# Patient Record
Sex: FEMALE | Race: WHITE | NOT HISPANIC OR LATINO | Employment: OTHER | URBAN - METROPOLITAN AREA
[De-identification: names, ages, dates, MRNs, and addresses within clinical notes are randomized per-mention and may not be internally consistent; named-entity substitution may affect disease eponyms.]

---

## 2019-09-19 ENCOUNTER — OFFICE VISIT (OUTPATIENT)
Dept: FAMILY MEDICINE CLINIC | Facility: CLINIC | Age: 54
End: 2019-09-19

## 2019-09-19 VITALS
WEIGHT: 143.4 LBS | OXYGEN SATURATION: 94 % | DIASTOLIC BLOOD PRESSURE: 62 MMHG | TEMPERATURE: 97.6 F | HEIGHT: 60 IN | HEART RATE: 90 BPM | BODY MASS INDEX: 28.16 KG/M2 | SYSTOLIC BLOOD PRESSURE: 142 MMHG

## 2019-09-19 DIAGNOSIS — K21.9 GERD WITHOUT ESOPHAGITIS: ICD-10-CM

## 2019-09-19 DIAGNOSIS — E11.9 TYPE 2 DIABETES MELLITUS WITHOUT COMPLICATION, WITHOUT LONG-TERM CURRENT USE OF INSULIN (HCC): Primary | ICD-10-CM

## 2019-09-19 DIAGNOSIS — E78.2 MIXED HYPERLIPIDEMIA: ICD-10-CM

## 2019-09-19 DIAGNOSIS — Z79.899 ENCOUNTER FOR LONG-TERM (CURRENT) USE OF MEDICATIONS: ICD-10-CM

## 2019-09-19 PROBLEM — E78.5 HYPERLIPIDEMIA: Status: ACTIVE | Noted: 2019-09-19

## 2019-09-19 LAB — HBA1C MFR BLD: 7.4 %

## 2019-09-19 PROCEDURE — 99214 OFFICE O/P EST MOD 30 MIN: CPT | Performed by: FAMILY MEDICINE

## 2019-09-19 PROCEDURE — 83036 HEMOGLOBIN GLYCOSYLATED A1C: CPT | Performed by: FAMILY MEDICINE

## 2019-09-19 RX ORDER — CYCLOBENZAPRINE HCL 10 MG
TABLET ORAL
COMMUNITY
Start: 2019-09-18

## 2019-09-19 RX ORDER — ATORVASTATIN CALCIUM 40 MG/1
TABLET, FILM COATED ORAL
COMMUNITY
Start: 2019-06-19 | End: 2019-09-19 | Stop reason: SDUPTHER

## 2019-09-19 RX ORDER — METFORMIN HYDROCHLORIDE 500 MG/1
1000 TABLET, EXTENDED RELEASE ORAL 2 TIMES DAILY
Qty: 360 TABLET | Refills: 2 | Status: SHIPPED | OUTPATIENT
Start: 2019-09-19 | End: 2020-06-03

## 2019-09-19 RX ORDER — CLOBETASOL PROPIONATE 0.5 MG/G
CREAM TOPICAL
Refills: 5 | COMMUNITY
Start: 2019-07-30

## 2019-09-19 RX ORDER — GABAPENTIN 800 MG/1
TABLET ORAL
COMMUNITY
Start: 2019-08-16 | End: 2021-03-25 | Stop reason: DRUGHIGH

## 2019-09-19 RX ORDER — ATORVASTATIN CALCIUM 40 MG/1
40 TABLET, FILM COATED ORAL NIGHTLY
Qty: 90 TABLET | Refills: 1 | Status: SHIPPED | OUTPATIENT
Start: 2019-09-19 | End: 2020-06-03 | Stop reason: SDUPTHER

## 2019-09-19 RX ORDER — METFORMIN HYDROCHLORIDE 500 MG/1
TABLET, EXTENDED RELEASE ORAL
COMMUNITY
Start: 2019-06-19 | End: 2019-09-19 | Stop reason: SDUPTHER

## 2019-09-19 RX ORDER — HYDROCODONE BITARTRATE AND ACETAMINOPHEN 10; 325 MG/1; MG/1
TABLET ORAL
COMMUNITY
Start: 2019-08-24 | End: 2022-10-20

## 2019-09-19 NOTE — PROGRESS NOTES
Subjective   Fang Kunz is a 53 y.o. female.     Chief Complaint   Patient presents with   • Diabetes   • Hyperlipidemia       Patient is here today to follow-up on her chronic medical conditions:    Hyperlipidemia.  The patient is compliant with taking atorvastatin 40 mg daily and denies any side effects.  We did get the patient's cholesterol 3 months ago but it was elevated because the patient had not been taking her atorvastatin consistently at that time.  However for the past 3 months she has been more consistently taking her cholesterol medicine.    Diabetes.  The patient is compliant with taking metformin twice daily as directed.  She denies any side effects.  Patient states that her home blood sugar readings have been running a little higher than normal.    Reflux.  The patient takes Tums as needed symptoms.  She states that she gets symptoms about once maybe twice a month at the most.    Patient is also here for a new problem.  She stated that this morning she noticed a tender area on her left lower rib cage.  She denies any recent trauma or injury.  She states that it does not hurt unless she pushes on the area.  She denies any nausea or vomiting, bowel changes or fever.  She has not noticed any redness or bruising in the area.         Review of Systems   Constitutional: Negative for activity change, chills, fatigue and fever.   HENT: Negative for hearing loss, swollen glands, tinnitus and trouble swallowing.    Eyes: Negative for pain and visual disturbance.   Respiratory: Negative for cough and shortness of breath.    Cardiovascular: Negative for chest pain, palpitations and leg swelling.   Gastrointestinal: Negative for diarrhea and nausea.   Endocrine: Negative for polydipsia and polyuria.   Genitourinary: Negative for difficulty urinating and urinary incontinence.   Musculoskeletal: Negative for arthralgias, gait problem and joint swelling.   Skin: Negative for rash.   Allergic/Immunologic:  Negative for immunocompromised state.   Neurological: Negative for dizziness, light-headedness and headache.   Hematological: Negative for adenopathy. Does not bruise/bleed easily.   Psychiatric/Behavioral: Negative for dysphoric mood and sleep disturbance.       The following portions of the patient's history were reviewed and updated as appropriate: allergies, current medications, past family history, past medical history, past social history, past surgical history and problem list.    Past Medical History:   Diagnosis Date   • Abnormal thyroid screen (blood)    • Anxiety disorder    • Attention deficit disorder without hyperactivity    • Disc degeneration, lumbar    • Encounter for long-term (current) use of medications    • GERD without esophagitis    • Hyperlipidemia    • Joint pain, hip    • Joint pain, knee    • Measles without complication    • Migraine headache    • Mumps    • Type 2 diabetes mellitus without complication, without long-term current use of insulin (CMS/[facility])    • Type 2 diabetes mellitus, uncontrolled (CMS/[facility])    • Varicella        Past Surgical History:   Procedure Laterality Date   • TOTAL ABDOMINAL HYSTERECTOMY WITH SALPINGO OOPHORECTOMY  2004    for benign disease       Family History   Problem Relation Age of Onset   • Heart disease Other    • Diabetes Other        Social History     Socioeconomic History   • Marital status:      Spouse name: Not on file   • Number of children: Not on file   • Years of education: Not on file   • Highest education level: Not on file   Tobacco Use   • Smoking status: Current Every Day Smoker   • Tobacco comment: 1/2 PPD x 15+ years   Substance and Sexual Activity   • Alcohol use: Yes     Comment: rare         Current Outpatient Medications:   •  atorvastatin (LIPITOR) 40 MG tablet, Take 1 tablet by mouth Every Night., Disp: 90 tablet, Rfl: 1  •  cyclobenzaprine (FLEXERIL) 10 MG tablet, , Disp: , Rfl:   •  gabapentin (NEURONTIN) 800 MG tablet, ,  "Disp: , Rfl:   •  HYDROcodone-acetaminophen (NORCO)  MG per tablet, , Disp: , Rfl:   •  metFORMIN ER (GLUCOPHAGE-XR) 500 MG 24 hr tablet, Take 2 tablets by mouth 2 (Two) Times a Day., Disp: 360 tablet, Rfl: 2  •  clobetasol (TEMOVATE) 0.05 % cream, , Disp: , Rfl: 5    Objective     Vitals:    09/19/19 1500   BP: 142/62   Pulse: 90   Temp: 97.6 °F (36.4 °C)   SpO2: 94%   Weight: 65 kg (143 lb 6.4 oz)   Height: 152.4 cm (60\")       Body mass index is 28.01 kg/m².    No components found for: 2D    Physical Exam   Constitutional: She is oriented to person, place, and time. She appears well-developed and well-nourished.   HENT:   Head: Normocephalic and atraumatic.   Eyes: Conjunctivae are normal. No scleral icterus.   Neck: Normal range of motion. Neck supple. No thyromegaly present.   Cardiovascular: Normal rate, regular rhythm, normal heart sounds and intact distal pulses.   Pulmonary/Chest: Effort normal and breath sounds normal.   Abdominal: Soft. Bowel sounds are normal. She exhibits no distension and no mass. There is no tenderness. There is no rebound and no guarding.   Musculoskeletal: Normal range of motion. She exhibits no edema.   Tenderness to palpation over the left anterior lower rib cage.  No ecchymosis or erythema noted   Neurological: She is alert and oriented to person, place, and time.   Skin: Skin is warm and dry. Capillary refill takes less than 2 seconds. No rash noted.   Psychiatric: She has a normal mood and affect. Her behavior is normal. Judgment and thought content normal.   Nursing note and vitals reviewed.      Procedures    Assessment/Plan   Fang was seen today for diabetes and hyperlipidemia.    Diagnoses and all orders for this visit:    Type 2 diabetes mellitus without complication, without long-term current use of insulin (CMS/[facility])  -     POC Glycosylated Hemoglobin (Hb A1C)  -     Comprehensive Metabolic Panel  -     metFORMIN ER (GLUCOPHAGE-XR) 500 MG 24 hr tablet; Take 2 " tablets by mouth 2 (Two) Times a Day.    Mixed hyperlipidemia  -     Lipid Panel  -     atorvastatin (LIPITOR) 40 MG tablet; Take 1 tablet by mouth Every Night.    GERD without esophagitis  -     CBC & Differential    Encounter for long-term (current) use of medications  -     Lipid Panel  -     TSH  -     Comprehensive Metabolic Panel  -     CBC & Differential        Patient Instructions   Reassurance given that the area in the left lower rib cage was likely related to a musculoskeletal problem and should improve slowly given time.  I advised the patient to use ice no heat.  I increased metformin from twice daily to 4 times daily for better blood sugar control.  Any medication changes will be made based on labs drawn today and will be called to the patient later this week.           EMR Dragon/transcription disclaimer:  Much of this encounter note is an electronic transcription of spoken language to printed text.  The electronic translation of spoken language may permit errors, omissions or at times nonsensical words or phrases to be inadvertently transcribed.  Although I have reviewed the note for such errors, some may still exist.  Please contact the office for any clarification.

## 2019-09-19 NOTE — PATIENT INSTRUCTIONS
Reassurance given that the area in the left lower rib cage was likely related to a musculoskeletal problem and should improve slowly given time.  I advised the patient to use ice no heat.  I increased metformin from twice daily to 4 times daily for better blood sugar control.  Any medication changes will be made based on labs drawn today and will be called to the patient later this week.

## 2019-09-20 LAB
ALBUMIN SERPL-MCNC: 4.5 G/DL (ref 3.5–5.5)
ALBUMIN/GLOB SERPL: 1.7 {RATIO} (ref 1.2–2.2)
ALP SERPL-CCNC: 72 IU/L (ref 39–117)
ALT SERPL-CCNC: 29 IU/L (ref 0–32)
AST SERPL-CCNC: 20 IU/L (ref 0–40)
BASOPHILS # BLD AUTO: 0 X10E3/UL (ref 0–0.2)
BASOPHILS NFR BLD AUTO: 0 %
BILIRUB SERPL-MCNC: 0.3 MG/DL (ref 0–1.2)
BUN SERPL-MCNC: 8 MG/DL (ref 6–24)
BUN/CREAT SERPL: 14 (ref 9–23)
CALCIUM SERPL-MCNC: 9.8 MG/DL (ref 8.7–10.2)
CHLORIDE SERPL-SCNC: 101 MMOL/L (ref 96–106)
CHOLEST SERPL-MCNC: 97 MG/DL (ref 100–199)
CO2 SERPL-SCNC: 20 MMOL/L (ref 20–29)
CREAT SERPL-MCNC: 0.58 MG/DL (ref 0.57–1)
EOSINOPHIL # BLD AUTO: 0.1 X10E3/UL (ref 0–0.4)
EOSINOPHIL NFR BLD AUTO: 1 %
ERYTHROCYTE [DISTWIDTH] IN BLOOD BY AUTOMATED COUNT: 13.1 % (ref 12.3–15.4)
GLOBULIN SER CALC-MCNC: 2.6 G/DL (ref 1.5–4.5)
GLUCOSE SERPL-MCNC: 125 MG/DL (ref 65–99)
HCT VFR BLD AUTO: 40.8 % (ref 34–46.6)
HDLC SERPL-MCNC: 38 MG/DL
HGB BLD-MCNC: 14.3 G/DL (ref 11.1–15.9)
IMM GRANULOCYTES # BLD AUTO: 0 X10E3/UL (ref 0–0.1)
IMM GRANULOCYTES NFR BLD AUTO: 0 %
LDLC SERPL CALC-MCNC: 33 MG/DL (ref 0–99)
LYMPHOCYTES # BLD AUTO: 4.5 X10E3/UL (ref 0.7–3.1)
LYMPHOCYTES NFR BLD AUTO: 53 %
MCH RBC QN AUTO: 33 PG (ref 26.6–33)
MCHC RBC AUTO-ENTMCNC: 35 G/DL (ref 31.5–35.7)
MCV RBC AUTO: 94 FL (ref 79–97)
MONOCYTES # BLD AUTO: 0.3 X10E3/UL (ref 0.1–0.9)
MONOCYTES NFR BLD AUTO: 3 %
NEUTROPHILS # BLD AUTO: 3.6 X10E3/UL (ref 1.4–7)
NEUTROPHILS NFR BLD AUTO: 43 %
PLATELET # BLD AUTO: 200 X10E3/UL (ref 150–450)
POTASSIUM SERPL-SCNC: 4.3 MMOL/L (ref 3.5–5.2)
PROT SERPL-MCNC: 7.1 G/DL (ref 6–8.5)
RBC # BLD AUTO: 4.33 X10E6/UL (ref 3.77–5.28)
SODIUM SERPL-SCNC: 140 MMOL/L (ref 134–144)
TRIGL SERPL-MCNC: 129 MG/DL (ref 0–149)
TSH SERPL DL<=0.005 MIU/L-ACNC: 0.77 UIU/ML (ref 0.45–4.5)
VLDLC SERPL CALC-MCNC: 26 MG/DL (ref 5–40)
WBC # BLD AUTO: 8.4 X10E3/UL (ref 3.4–10.8)

## 2020-02-13 ENCOUNTER — OFFICE VISIT (OUTPATIENT)
Dept: FAMILY MEDICINE CLINIC | Facility: CLINIC | Age: 55
End: 2020-02-13

## 2020-02-13 VITALS
HEART RATE: 85 BPM | SYSTOLIC BLOOD PRESSURE: 112 MMHG | TEMPERATURE: 98.1 F | BODY MASS INDEX: 28.94 KG/M2 | DIASTOLIC BLOOD PRESSURE: 70 MMHG | WEIGHT: 147.4 LBS | OXYGEN SATURATION: 98 % | HEIGHT: 60 IN

## 2020-02-13 DIAGNOSIS — J06.9 ACUTE URI: Primary | ICD-10-CM

## 2020-02-13 PROCEDURE — 99213 OFFICE O/P EST LOW 20 MIN: CPT | Performed by: FAMILY MEDICINE

## 2020-02-13 RX ORDER — ALBUTEROL SULFATE 90 UG/1
2 AEROSOL, METERED RESPIRATORY (INHALATION) EVERY 4 HOURS PRN
COMMUNITY

## 2020-02-13 RX ORDER — DEXTROMETHORPHAN HYDROBROMIDE AND PROMETHAZINE HYDROCHLORIDE 15; 6.25 MG/5ML; MG/5ML
5 SYRUP ORAL 4 TIMES DAILY PRN
Qty: 180 ML | Refills: 0 | Status: SHIPPED | OUTPATIENT
Start: 2020-02-13 | End: 2020-02-19 | Stop reason: SDUPTHER

## 2020-02-13 RX ORDER — BENZONATATE 200 MG/1
200 CAPSULE ORAL 3 TIMES DAILY PRN
Qty: 30 CAPSULE | Refills: 0 | Status: SHIPPED | OUTPATIENT
Start: 2020-02-13 | End: 2020-08-13

## 2020-02-13 NOTE — PROGRESS NOTES
Subjective   Fang Kunz is a 54 y.o. female.     Chief Complaint   Patient presents with   • Nasal Congestion   • Ear Fullness     not sore   • Cough       Patient is here with a new problem.  She started about 2 to 3 days ago with nasal congestion, cough, scratchy throat, and sinus congestion. +fatigue and body aches.  Patient did receive the flu vaccine this year.  Patient does smoke.  No fever or chills       Review of Systems   Constitutional: Positive for fatigue. Negative for activity change, chills and fever.   HENT: Positive for congestion, postnasal drip, rhinorrhea and sinus pressure. Negative for hearing loss, sore throat, swollen glands, tinnitus and trouble swallowing.    Eyes: Negative for pain and visual disturbance.   Respiratory: Positive for cough. Negative for shortness of breath.    Cardiovascular: Negative for chest pain, palpitations and leg swelling.   Gastrointestinal: Negative for diarrhea and nausea.   Endocrine: Negative for polydipsia and polyuria.   Genitourinary: Negative for difficulty urinating and urinary incontinence.   Musculoskeletal: Positive for myalgias. Negative for arthralgias, gait problem and joint swelling.   Skin: Negative for rash.   Allergic/Immunologic: Negative for immunocompromised state.   Neurological: Negative for dizziness, light-headedness and headache.   Hematological: Negative for adenopathy. Does not bruise/bleed easily.   Psychiatric/Behavioral: Negative for dysphoric mood and sleep disturbance.       The following portions of the patient's history were reviewed and updated as appropriate: allergies, current medications, past family history, past medical history, past social history, past surgical history and problem list.    Past Medical History:   Diagnosis Date   • Abnormal thyroid screen (blood)    • Anxiety disorder    • Attention deficit disorder without hyperactivity    • Disc degeneration, lumbar    • Encounter for long-term (current) use of  medications    • GERD without esophagitis    • Hyperlipidemia    • Joint pain, hip    • Joint pain, knee    • Measles without complication    • Migraine headache    • Mumps    • Type 2 diabetes mellitus without complication, without long-term current use of insulin (CMS/[facility])    • Type 2 diabetes mellitus, uncontrolled (CMS/[facility])    • Varicella        Past Surgical History:   Procedure Laterality Date   • TOTAL ABDOMINAL HYSTERECTOMY WITH SALPINGO OOPHORECTOMY  2004    for benign disease       Family History   Problem Relation Age of Onset   • Heart disease Other    • Diabetes Other        Social History     Socioeconomic History   • Marital status:      Spouse name: Not on file   • Number of children: Not on file   • Years of education: Not on file   • Highest education level: Not on file   Tobacco Use   • Smoking status: Current Every Day Smoker   • Tobacco comment: 1/2 PPD x 15+ years   Substance and Sexual Activity   • Alcohol use: Yes     Comment: rare   • Drug use: Never         Current Outpatient Medications:   •  albuterol sulfate  (90 Base) MCG/ACT inhaler, Inhale 2 puffs Every 4 (Four) Hours As Needed., Disp: , Rfl:   •  atorvastatin (LIPITOR) 40 MG tablet, Take 1 tablet by mouth Every Night., Disp: 90 tablet, Rfl: 1  •  cyclobenzaprine (FLEXERIL) 10 MG tablet, , Disp: , Rfl:   •  gabapentin (NEURONTIN) 800 MG tablet, , Disp: , Rfl:   •  HYDROcodone-acetaminophen (NORCO)  MG per tablet, , Disp: , Rfl:   •  metFORMIN ER (GLUCOPHAGE-XR) 500 MG 24 hr tablet, Take 2 tablets by mouth 2 (Two) Times a Day., Disp: 360 tablet, Rfl: 2  •  benzonatate (TESSALON) 200 MG capsule, Take 1 capsule by mouth 3 (Three) Times a Day As Needed for Cough., Disp: 30 capsule, Rfl: 0  •  clobetasol (TEMOVATE) 0.05 % cream, , Disp: , Rfl: 5  •  promethazine-dextromethorphan (PROMETHAZINE-DM) 6.25-15 MG/5ML syrup, Take 5 mL by mouth 4 (Four) Times a Day As Needed for Cough., Disp: 180 mL, Rfl: 0  •  Unable to find,  ", Disp: , Rfl:     Objective     Vitals:    02/13/20 1306   BP: 112/70   Pulse: 85   Temp: 98.1 °F (36.7 °C)   SpO2: 98%   Weight: 66.9 kg (147 lb 6.4 oz)   Height: 152.4 cm (60\")       Body mass index is 28.79 kg/m².    No components found for: 2D    Physical Exam   Constitutional: She is oriented to person, place, and time. She appears well-developed and well-nourished.   HENT:   Head: Normocephalic and atraumatic.   Right Ear: External ear normal.   Left Ear: External ear normal.   Nose: Nose normal.   Mouth/Throat: Uvula is midline and mucous membranes are normal. Posterior oropharyngeal erythema present.   Eyes: Conjunctivae are normal. No scleral icterus.   Neck: Normal range of motion. Neck supple.   Cardiovascular: Normal rate, regular rhythm, normal heart sounds and intact distal pulses.   Pulmonary/Chest: Effort normal and breath sounds normal.   Abdominal: Soft. Bowel sounds are normal.   Musculoskeletal: Normal range of motion. She exhibits no edema.   Lymphadenopathy:     She has no cervical adenopathy.   Neurological: She is alert and oriented to person, place, and time.   Skin: Skin is warm and dry. Capillary refill takes less than 2 seconds. No rash noted.   Psychiatric: She has a normal mood and affect. Her behavior is normal. Judgment and thought content normal.   Nursing note and vitals reviewed.      Procedures    Assessment/Plan   Fang was seen today for nasal congestion, ear fullness and cough.    Diagnoses and all orders for this visit:    Acute URI  -     promethazine-dextromethorphan (PROMETHAZINE-DM) 6.25-15 MG/5ML syrup; Take 5 mL by mouth 4 (Four) Times a Day As Needed for Cough.  -     benzonatate (TESSALON) 200 MG capsule; Take 1 capsule by mouth 3 (Three) Times a Day As Needed for Cough.        Patient Instructions   Patient was advised to call in 5 days if she is not improving and especially if she is worsening.  At that time I would consider calling in an antibiotic for a sinus " "infection.  Continue using albuterol inhaler as needed.  Stop smoking.      Upper Respiratory Infection, Adult  An upper respiratory infection (URI) affects the nose, throat, and upper air passages. URIs are caused by germs (viruses). The most common type of URI is often called \"the common cold.\"  Medicines cannot cure URIs, but you can do things at home to relieve your symptoms. URIs usually get better within 7-10 days.  Follow these instructions at home:  Activity  · Rest as needed.  · If you have a fever, stay home from work or school until your fever is gone, or until your doctor says you may return to work or school.  ? You should stay home until you cannot spread the infection anymore (you are not contagious).  ? Your doctor may have you wear a face mask so you have less risk of spreading the infection.  Relieving symptoms  · Gargle with a salt-water mixture 3-4 times a day or as needed. To make a salt-water mixture, completely dissolve ½-1 tsp of salt in 1 cup of warm water.  · Use a cool-mist humidifier to add moisture to the air. This can help you breathe more easily.  Eating and drinking    · Drink enough fluid to keep your pee (urine) pale yellow.  · Eat soups and other clear broths.  General instructions    · Take over-the-counter and prescription medicines only as told by your doctor. These include cold medicines, fever reducers, and cough suppressants.  · Do not use any products that contain nicotine or tobacco. These include cigarettes and e-cigarettes. If you need help quitting, ask your doctor.  · Avoid being where people are smoking (avoid secondhand smoke).  · Make sure you get regular shots and get the flu shot every year.  · Keep all follow-up visits as told by your doctor. This is important.  How to avoid spreading infection to others    · Wash your hands often with soap and water. If you do not have soap and water, use hand .  · Avoid touching your mouth, face, eyes, or nose.  · Cough " "or sneeze into a tissue or your sleeve or elbow. Do not cough or sneeze into your hand or into the air.  Contact a doctor if:  · You are getting worse, not better.  · You have any of these:  ? A fever.  ? Chills.  ? Brown or red mucus in your nose.  ? Yellow or brown fluid (discharge)coming from your nose.  ? Pain in your face, especially when you bend forward.  ? Swollen neck glands.  ? Pain with swallowing.  ? White areas in the back of your throat.  Get help right away if:  · You have shortness of breath that gets worse.  · You have very bad or constant:  ? Headache.  ? Ear pain.  ? Pain in your forehead, behind your eyes, and over your cheekbones (sinus pain).  ? Chest pain.  · You have long-lasting (chronic) lung disease along with any of these:  ? Wheezing.  ? Long-lasting cough.  ? Coughing up blood.  ? A change in your usual mucus.  · You have a stiff neck.  · You have changes in your:  ? Vision.  ? Hearing.  ? Thinking.  ? Mood.  Summary  · An upper respiratory infection (URI) is caused by a germ called a virus. The most common type of URI is often called \"the common cold.\"  · URIs usually get better within 7-10 days.  · Take over-the-counter and prescription medicines only as told by your doctor.  This information is not intended to replace advice given to you by your health care provider. Make sure you discuss any questions you have with your health care provider.  Document Released: 06/05/2009 Document Revised: 08/10/2018 Document Reviewed: 08/10/2018  [email or web address] Interactive Patient Education © 2019 [employer]             "

## 2020-02-13 NOTE — PATIENT INSTRUCTIONS
"Patient was advised to call in 5 days if she is not improving and especially if she is worsening.  At that time I would consider calling in an antibiotic for a sinus infection.  Continue using albuterol inhaler as needed.  Stop smoking.      Upper Respiratory Infection, Adult  An upper respiratory infection (URI) affects the nose, throat, and upper air passages. URIs are caused by germs (viruses). The most common type of URI is often called \"the common cold.\"  Medicines cannot cure URIs, but you can do things at home to relieve your symptoms. URIs usually get better within 7-10 days.  Follow these instructions at home:  Activity  · Rest as needed.  · If you have a fever, stay home from work or school until your fever is gone, or until your doctor says you may return to work or school.  ? You should stay home until you cannot spread the infection anymore (you are not contagious).  ? Your doctor may have you wear a face mask so you have less risk of spreading the infection.  Relieving symptoms  · Gargle with a salt-water mixture 3-4 times a day or as needed. To make a salt-water mixture, completely dissolve ½-1 tsp of salt in 1 cup of warm water.  · Use a cool-mist humidifier to add moisture to the air. This can help you breathe more easily.  Eating and drinking    · Drink enough fluid to keep your pee (urine) pale yellow.  · Eat soups and other clear broths.  General instructions    · Take over-the-counter and prescription medicines only as told by your doctor. These include cold medicines, fever reducers, and cough suppressants.  · Do not use any products that contain nicotine or tobacco. These include cigarettes and e-cigarettes. If you need help quitting, ask your doctor.  · Avoid being where people are smoking (avoid secondhand smoke).  · Make sure you get regular shots and get the flu shot every year.  · Keep all follow-up visits as told by your doctor. This is important.  How to avoid spreading infection to " "others    · Wash your hands often with soap and water. If you do not have soap and water, use hand .  · Avoid touching your mouth, face, eyes, or nose.  · Cough or sneeze into a tissue or your sleeve or elbow. Do not cough or sneeze into your hand or into the air.  Contact a doctor if:  · You are getting worse, not better.  · You have any of these:  ? A fever.  ? Chills.  ? Brown or red mucus in your nose.  ? Yellow or brown fluid (discharge)coming from your nose.  ? Pain in your face, especially when you bend forward.  ? Swollen neck glands.  ? Pain with swallowing.  ? White areas in the back of your throat.  Get help right away if:  · You have shortness of breath that gets worse.  · You have very bad or constant:  ? Headache.  ? Ear pain.  ? Pain in your forehead, behind your eyes, and over your cheekbones (sinus pain).  ? Chest pain.  · You have long-lasting (chronic) lung disease along with any of these:  ? Wheezing.  ? Long-lasting cough.  ? Coughing up blood.  ? A change in your usual mucus.  · You have a stiff neck.  · You have changes in your:  ? Vision.  ? Hearing.  ? Thinking.  ? Mood.  Summary  · An upper respiratory infection (URI) is caused by a germ called a virus. The most common type of URI is often called \"the common cold.\"  · URIs usually get better within 7-10 days.  · Take over-the-counter and prescription medicines only as told by your doctor.  This information is not intended to replace advice given to you by your health care provider. Make sure you discuss any questions you have with your health care provider.  Document Released: 06/05/2009 Document Revised: 08/10/2018 Document Reviewed: 08/10/2018  [employer] Interactive Patient Education © 2019 [employer]    "

## 2020-02-19 ENCOUNTER — TELEPHONE (OUTPATIENT)
Dept: FAMILY MEDICINE CLINIC | Facility: CLINIC | Age: 55
End: 2020-02-19

## 2020-02-19 DIAGNOSIS — J06.9 ACUTE URI: Primary | ICD-10-CM

## 2020-02-19 RX ORDER — DOXYCYCLINE HYCLATE 100 MG/1
100 TABLET, DELAYED RELEASE ORAL 2 TIMES DAILY
Qty: 20 TABLET | Refills: 0 | Status: SHIPPED | OUTPATIENT
Start: 2020-02-19 | End: 2020-08-13

## 2020-02-19 RX ORDER — DEXTROMETHORPHAN HYDROBROMIDE AND PROMETHAZINE HYDROCHLORIDE 15; 6.25 MG/5ML; MG/5ML
5 SYRUP ORAL 4 TIMES DAILY PRN
Qty: 180 ML | Refills: 0 | Status: SHIPPED | OUTPATIENT
Start: 2020-02-19 | End: 2020-08-13

## 2020-02-19 NOTE — TELEPHONE ENCOUNTER
I have sent in doxycycline 100 mg twice daily for the next 10 days and a refill of the cough syrup.

## 2020-02-19 NOTE — TELEPHONE ENCOUNTER
Patient was seen Thursday of last week. She was told to call by the early of end of this week if she wasn't better and an ABT would be called in. Patient is calling requesting an ABT to be sent in but to also get a refill on her cough syrup.

## 2020-06-03 DIAGNOSIS — E11.9 TYPE 2 DIABETES MELLITUS WITHOUT COMPLICATION, WITHOUT LONG-TERM CURRENT USE OF INSULIN (HCC): ICD-10-CM

## 2020-06-03 DIAGNOSIS — E78.2 MIXED HYPERLIPIDEMIA: ICD-10-CM

## 2020-06-03 RX ORDER — ATORVASTATIN CALCIUM 40 MG/1
40 TABLET, FILM COATED ORAL NIGHTLY
Qty: 90 TABLET | Refills: 0 | OUTPATIENT
Start: 2020-06-03

## 2020-06-03 RX ORDER — ATORVASTATIN CALCIUM 40 MG/1
40 TABLET, FILM COATED ORAL NIGHTLY
Qty: 90 TABLET | Refills: 0 | Status: SHIPPED | OUTPATIENT
Start: 2020-06-03 | End: 2020-11-16 | Stop reason: SDUPTHER

## 2020-06-03 RX ORDER — METFORMIN HYDROCHLORIDE 500 MG/1
1000 TABLET, EXTENDED RELEASE ORAL 2 TIMES DAILY
Qty: 360 TABLET | Refills: 0 | Status: SHIPPED | OUTPATIENT
Start: 2020-06-03 | End: 2020-08-13 | Stop reason: SDUPTHER

## 2020-06-03 RX ORDER — METFORMIN HYDROCHLORIDE 500 MG/1
1000 TABLET, EXTENDED RELEASE ORAL 2 TIMES DAILY
COMMUNITY
End: 2020-06-03 | Stop reason: SDUPTHER

## 2020-08-13 ENCOUNTER — OFFICE VISIT (OUTPATIENT)
Dept: FAMILY MEDICINE CLINIC | Facility: CLINIC | Age: 55
End: 2020-08-13

## 2020-08-13 VITALS
OXYGEN SATURATION: 99 % | WEIGHT: 141 LBS | HEART RATE: 92 BPM | HEIGHT: 60 IN | TEMPERATURE: 97.8 F | DIASTOLIC BLOOD PRESSURE: 64 MMHG | BODY MASS INDEX: 27.68 KG/M2 | SYSTOLIC BLOOD PRESSURE: 122 MMHG

## 2020-08-13 DIAGNOSIS — Z79.899 ENCOUNTER FOR LONG-TERM (CURRENT) USE OF MEDICATIONS: ICD-10-CM

## 2020-08-13 DIAGNOSIS — K21.9 GERD WITHOUT ESOPHAGITIS: ICD-10-CM

## 2020-08-13 DIAGNOSIS — E11.9 TYPE 2 DIABETES MELLITUS WITHOUT COMPLICATION, WITHOUT LONG-TERM CURRENT USE OF INSULIN (HCC): Primary | ICD-10-CM

## 2020-08-13 DIAGNOSIS — E78.2 MIXED HYPERLIPIDEMIA: ICD-10-CM

## 2020-08-13 LAB
HBA1C MFR BLD: 7.5 %
POC CREATININE URINE: 50
POC MICROALBUMIN URINE: 30

## 2020-08-13 PROCEDURE — 83036 HEMOGLOBIN GLYCOSYLATED A1C: CPT | Performed by: FAMILY MEDICINE

## 2020-08-13 PROCEDURE — 99214 OFFICE O/P EST MOD 30 MIN: CPT | Performed by: FAMILY MEDICINE

## 2020-08-13 PROCEDURE — 82044 UR ALBUMIN SEMIQUANTITATIVE: CPT | Performed by: FAMILY MEDICINE

## 2020-08-13 RX ORDER — METFORMIN HYDROCHLORIDE 500 MG/1
1000 TABLET, EXTENDED RELEASE ORAL 2 TIMES DAILY
Qty: 360 TABLET | Refills: 0 | Status: SHIPPED | OUTPATIENT
Start: 2020-08-13 | End: 2020-09-16

## 2020-08-13 NOTE — PROGRESS NOTES
Subjective   Fang Kunz is a 54 y.o. female.     Chief Complaint   Patient presents with   • Diabetes   • Hyperlipidemia       Patient is here today to follow-up on her chronic medical conditions:    Hyperlipidemia.  The patient is compliant with taking atorvastatin 40 mg daily and denies any side effects.  Cholesterol was at goal September 2020    Diabetes.  The patient is compliant with taking metformin 1000 mg twice daily as directed.  She denies any side effects.  Patient states that her home blood sugar readings have been running a little better lately    Reflux.  The patient takes Tums as needed symptoms.  She states that she gets symptoms about once maybe twice a week at the most.       Review of Systems   Constitutional: Negative for activity change, chills, fatigue and fever.   HENT: Negative for hearing loss, swollen glands, tinnitus and trouble swallowing.    Eyes: Negative for pain and visual disturbance.   Respiratory: Negative for cough and shortness of breath.    Cardiovascular: Negative for chest pain, palpitations and leg swelling.   Gastrointestinal: Negative for diarrhea and nausea.   Endocrine: Negative for polydipsia and polyuria.   Genitourinary: Negative for difficulty urinating and urinary incontinence.   Musculoskeletal: Negative for arthralgias, gait problem and joint swelling.   Skin: Negative for rash.   Allergic/Immunologic: Negative for immunocompromised state.   Neurological: Negative for dizziness, light-headedness and headache.   Hematological: Negative for adenopathy. Does not bruise/bleed easily.   Psychiatric/Behavioral: Negative for dysphoric mood and sleep disturbance.       The following portions of the patient's history were reviewed and updated as appropriate: allergies, current medications, past family history, past medical history, past social history, past surgical history and problem list.    Past Medical History:   Diagnosis Date   • Abnormal thyroid screen (blood)     • Anxiety disorder    • Attention deficit disorder without hyperactivity    • Disc degeneration, lumbar    • Encounter for long-term (current) use of medications    • GERD without esophagitis    • Hyperlipidemia    • Joint pain, hip    • Joint pain, knee    • Measles without complication    • Migraine headache    • Mumps    • Type 2 diabetes mellitus without complication, without long-term current use of insulin (CMS/[facility])    • Type 2 diabetes mellitus, uncontrolled (CMS/[facility])    • Varicella        Past Surgical History:   Procedure Laterality Date   • TOTAL ABDOMINAL HYSTERECTOMY WITH SALPINGO OOPHORECTOMY  2004    for benign disease       Family History   Problem Relation Age of Onset   • Heart disease Other    • Diabetes Other        Social History     Socioeconomic History   • Marital status:      Spouse name: Not on file   • Number of children: Not on file   • Years of education: Not on file   • Highest education level: Not on file   Tobacco Use   • Smoking status: Current Every Day Smoker   • Smokeless tobacco: Never Used   • Tobacco comment: 1/2 PPD x 15+ years   Substance and Sexual Activity   • Alcohol use: Yes     Comment: rare   • Drug use: Never         Current Outpatient Medications:   •  albuterol sulfate  (90 Base) MCG/ACT inhaler, Inhale 2 puffs Every 4 (Four) Hours As Needed., Disp: , Rfl:   •  atorvastatin (LIPITOR) 40 MG tablet, Take 1 tablet by mouth Every Night., Disp: 90 tablet, Rfl: 0  •  cyclobenzaprine (FLEXERIL) 10 MG tablet, , Disp: , Rfl:   •  gabapentin (NEURONTIN) 800 MG tablet, , Disp: , Rfl:   •  HYDROcodone-acetaminophen (NORCO)  MG per tablet, , Disp: , Rfl:   •  metFORMIN ER (GLUCOPHAGE-XR) 500 MG 24 hr tablet, Take 2 tablets by mouth 2 (Two) Times a Day., Disp: 360 tablet, Rfl: 0  •  clobetasol (TEMOVATE) 0.05 % cream, , Disp: , Rfl: 5  •  Unable to find, , Disp: , Rfl:     Objective     Vitals:    08/13/20 1237   BP: 122/64   Pulse: 92   Temp: 97.8 °F (36.6  "°C)   SpO2: 99%   Weight: 64 kg (141 lb)   Height: 152.4 cm (60\")       Body mass index is 27.54 kg/m².    No components found for: 2D    Physical Exam   Constitutional: She is oriented to person, place, and time. She appears well-developed and well-nourished.   HENT:   Head: Normocephalic and atraumatic.   Eyes: Conjunctivae are normal.   Neck: Normal range of motion. Neck supple.   Cardiovascular: Normal rate, regular rhythm, normal heart sounds and intact distal pulses.   Pulmonary/Chest: Effort normal and breath sounds normal.   Abdominal: Soft. Bowel sounds are normal.   Musculoskeletal: Normal range of motion. She exhibits no edema.   Neurological: She is alert and oriented to person, place, and time.   Skin: Skin is warm and dry. Capillary refill takes less than 2 seconds. No rash noted.   Psychiatric: She has a normal mood and affect. Her behavior is normal. Judgment and thought content normal.   Nursing note and vitals reviewed.      Procedures    Assessment/Plan   Fang was seen today for diabetes and hyperlipidemia.    Diagnoses and all orders for this visit:    Type 2 diabetes mellitus without complication, without long-term current use of insulin (CMS/[facility])  -     POC Glycosylated Hemoglobin (Hb A1C)  -     POC Microalbumin  -     Comprehensive Metabolic Panel  -     metFORMIN ER (GLUCOPHAGE-XR) 500 MG 24 hr tablet; Take 2 tablets by mouth 2 (Two) Times a Day.    Mixed hyperlipidemia  -     Lipid Panel    GERD without esophagitis    Encounter for long-term (current) use of medications  -     CBC & Differential  -     Comprehensive Metabolic Panel  -     TSH  -     Lipid Panel    Surveillance labs were obtained today and any medication changes will be made based on lab results and will be called to the patient later this week.    There are no Patient Instructions on file for this visit.         "

## 2020-08-14 LAB
ALBUMIN SERPL-MCNC: 4.5 G/DL (ref 3.8–4.9)
ALBUMIN/GLOB SERPL: 1.7 {RATIO} (ref 1.2–2.2)
ALP SERPL-CCNC: 70 IU/L (ref 39–117)
ALT SERPL-CCNC: 30 IU/L (ref 0–32)
AST SERPL-CCNC: 23 IU/L (ref 0–40)
BASOPHILS # BLD AUTO: 0 X10E3/UL (ref 0–0.2)
BASOPHILS NFR BLD AUTO: 0 %
BILIRUB SERPL-MCNC: <0.2 MG/DL (ref 0–1.2)
BUN SERPL-MCNC: 7 MG/DL (ref 6–24)
BUN/CREAT SERPL: 12 (ref 9–23)
CALCIUM SERPL-MCNC: 9.5 MG/DL (ref 8.7–10.2)
CHLORIDE SERPL-SCNC: 104 MMOL/L (ref 96–106)
CHOLEST SERPL-MCNC: 83 MG/DL (ref 100–199)
CO2 SERPL-SCNC: 22 MMOL/L (ref 20–29)
CREAT SERPL-MCNC: 0.57 MG/DL (ref 0.57–1)
EOSINOPHIL # BLD AUTO: 0.1 X10E3/UL (ref 0–0.4)
EOSINOPHIL NFR BLD AUTO: 1 %
ERYTHROCYTE [DISTWIDTH] IN BLOOD BY AUTOMATED COUNT: 12.5 % (ref 11.7–15.4)
GLOBULIN SER CALC-MCNC: 2.6 G/DL (ref 1.5–4.5)
GLUCOSE SERPL-MCNC: 97 MG/DL (ref 65–99)
HCT VFR BLD AUTO: 43.7 % (ref 34–46.6)
HDLC SERPL-MCNC: 33 MG/DL
HGB BLD-MCNC: 14.8 G/DL (ref 11.1–15.9)
IMM GRANULOCYTES # BLD AUTO: 0 X10E3/UL (ref 0–0.1)
IMM GRANULOCYTES NFR BLD AUTO: 0 %
LDLC SERPL CALC-MCNC: 24 MG/DL (ref 0–99)
LYMPHOCYTES # BLD AUTO: 5.1 X10E3/UL (ref 0.7–3.1)
LYMPHOCYTES NFR BLD AUTO: 54 %
MCH RBC QN AUTO: 32.7 PG (ref 26.6–33)
MCHC RBC AUTO-ENTMCNC: 33.9 G/DL (ref 31.5–35.7)
MCV RBC AUTO: 97 FL (ref 79–97)
MONOCYTES # BLD AUTO: 0.4 X10E3/UL (ref 0.1–0.9)
MONOCYTES NFR BLD AUTO: 5 %
NEUTROPHILS # BLD AUTO: 3.8 X10E3/UL (ref 1.4–7)
NEUTROPHILS NFR BLD AUTO: 40 %
PLATELET # BLD AUTO: 222 X10E3/UL (ref 150–450)
POTASSIUM SERPL-SCNC: 4.2 MMOL/L (ref 3.5–5.2)
PROT SERPL-MCNC: 7.1 G/DL (ref 6–8.5)
RBC # BLD AUTO: 4.52 X10E6/UL (ref 3.77–5.28)
SODIUM SERPL-SCNC: 139 MMOL/L (ref 134–144)
TRIGL SERPL-MCNC: 131 MG/DL (ref 0–149)
TSH SERPL DL<=0.005 MIU/L-ACNC: 0.6 UIU/ML (ref 0.45–4.5)
VLDLC SERPL CALC-MCNC: 26 MG/DL (ref 5–40)
WBC # BLD AUTO: 9.5 X10E3/UL (ref 3.4–10.8)

## 2020-09-16 DIAGNOSIS — E11.9 TYPE 2 DIABETES MELLITUS WITHOUT COMPLICATION, WITHOUT LONG-TERM CURRENT USE OF INSULIN (HCC): ICD-10-CM

## 2020-09-16 RX ORDER — METFORMIN HYDROCHLORIDE 500 MG/1
TABLET, EXTENDED RELEASE ORAL
Qty: 360 TABLET | Refills: 0 | Status: SHIPPED | OUTPATIENT
Start: 2020-09-16 | End: 2020-11-16 | Stop reason: SDUPTHER

## 2020-11-16 ENCOUNTER — OFFICE VISIT (OUTPATIENT)
Dept: FAMILY MEDICINE CLINIC | Facility: CLINIC | Age: 55
End: 2020-11-16

## 2020-11-16 VITALS
OXYGEN SATURATION: 98 % | SYSTOLIC BLOOD PRESSURE: 124 MMHG | TEMPERATURE: 98 F | HEIGHT: 60 IN | BODY MASS INDEX: 26.35 KG/M2 | DIASTOLIC BLOOD PRESSURE: 78 MMHG | HEART RATE: 104 BPM | WEIGHT: 134.2 LBS

## 2020-11-16 DIAGNOSIS — E78.2 MIXED HYPERLIPIDEMIA: ICD-10-CM

## 2020-11-16 DIAGNOSIS — E11.9 TYPE 2 DIABETES MELLITUS WITHOUT COMPLICATION, WITHOUT LONG-TERM CURRENT USE OF INSULIN (HCC): Primary | ICD-10-CM

## 2020-11-16 DIAGNOSIS — Z79.899 ENCOUNTER FOR LONG-TERM (CURRENT) USE OF MEDICATIONS: ICD-10-CM

## 2020-11-16 LAB — HBA1C MFR BLD: 6.6 %

## 2020-11-16 PROCEDURE — 99406 BEHAV CHNG SMOKING 3-10 MIN: CPT | Performed by: FAMILY MEDICINE

## 2020-11-16 PROCEDURE — 83036 HEMOGLOBIN GLYCOSYLATED A1C: CPT | Performed by: FAMILY MEDICINE

## 2020-11-16 PROCEDURE — 99214 OFFICE O/P EST MOD 30 MIN: CPT | Performed by: FAMILY MEDICINE

## 2020-11-16 RX ORDER — METFORMIN HYDROCHLORIDE 500 MG/1
1000 TABLET, EXTENDED RELEASE ORAL 2 TIMES DAILY
Qty: 360 TABLET | Refills: 1 | Status: SHIPPED | OUTPATIENT
Start: 2020-11-16 | End: 2021-03-10

## 2020-11-16 RX ORDER — ATORVASTATIN CALCIUM 40 MG/1
40 TABLET, FILM COATED ORAL NIGHTLY
Qty: 90 TABLET | Refills: 1 | Status: SHIPPED | OUTPATIENT
Start: 2020-11-16 | End: 2021-03-10

## 2020-11-16 NOTE — PROGRESS NOTES
Subjective   Fang Kunz is a 55 y.o. female.     Chief Complaint   Patient presents with   • Diabetes   • Nicotine Dependence     wants to discuss chantix   • Depression       Patient is here today to follow-up on her chronic medical conditions:    Hyperlipidemia.  The patient is compliant with taking atorvastatin 40 mg daily and denies any side effects.  Cholesterol was at goal September 2020    Diabetes.  The patient is compliant with taking metformin 1000 mg twice daily as directed.  She denies any side effects.  Patient states that her home blood sugar readings have been running a little better lately    Reflux.  The patient takes Tums as needed symptoms.  She states that she gets symptoms about once maybe twice a week at the most.     Smoking cessation.  The patient is ready to stop smoking.  She has tried Chantix in the past and had very good results the first time.  However she got back to smoking.  She denied any side effects of the medication previously.  She would like to quit smoking 2 weeks from now.    Fang Kunz  reports that she has been smoking. She has never used smokeless tobacco.. I have educated her on the risk of diseases from using tobacco products such as cancer, COPD, heart disease and cataracts.     I advised her to quit and she is willing to quit. We have discussed the following method/s for tobacco cessation:  Education Material Counseling OTC Cessation Products Prescription Medicaiton.  Together we have set a quit date for 2 weeks from today.  She will follow up with me in 1 month or sooner to check on her progress.    I spent 6 minutes counseling the patient.          Review of Systems   Constitutional: Negative for activity change, chills, fatigue and fever.   HENT: Negative for hearing loss, swollen glands, tinnitus and trouble swallowing.    Eyes: Negative for pain and visual disturbance.   Respiratory: Negative for cough and shortness of breath.    Cardiovascular:  Negative for chest pain, palpitations and leg swelling.   Gastrointestinal: Negative for diarrhea and nausea.   Endocrine: Negative for polydipsia and polyuria.   Genitourinary: Negative for difficulty urinating and urinary incontinence.   Musculoskeletal: Negative for arthralgias, gait problem and joint swelling.   Skin: Negative for rash.   Allergic/Immunologic: Negative for immunocompromised state.   Neurological: Negative for dizziness, light-headedness and headache.   Hematological: Negative for adenopathy. Does not bruise/bleed easily.   Psychiatric/Behavioral: Negative for dysphoric mood and sleep disturbance.       The following portions of the patient's history were reviewed and updated as appropriate: allergies, current medications, past family history, past medical history, past social history, past surgical history and problem list.    Past Medical History:   Diagnosis Date   • Abnormal thyroid screen (blood)    • Anxiety disorder    • Attention deficit disorder without hyperactivity    • Disc degeneration, lumbar    • Encounter for long-term (current) use of medications    • GERD without esophagitis    • Hyperlipidemia    • Joint pain, hip    • Joint pain, knee    • Measles without complication    • Migraine headache    • Mumps    • Type 2 diabetes mellitus without complication, without long-term current use of insulin (CMS/[facility])    • Type 2 diabetes mellitus, uncontrolled (CMS/[facility])    • Varicella        Past Surgical History:   Procedure Laterality Date   • TOTAL ABDOMINAL HYSTERECTOMY WITH SALPINGO OOPHORECTOMY  2004    for benign disease       Family History   Problem Relation Age of Onset   • Heart disease Other    • Diabetes Other        Social History     Socioeconomic History   • Marital status:      Spouse name: Not on file   • Number of children: Not on file   • Years of education: Not on file   • Highest education level: Not on file   Occupational History   • Occupation: diabled  "  Tobacco Use   • Smoking status: Current Every Day Smoker   • Smokeless tobacco: Never Used   • Tobacco comment: 1/2 PPD x 15+ years   Substance and Sexual Activity   • Alcohol use: Yes     Comment: rare   • Drug use: Never         Current Outpatient Medications:   •  albuterol sulfate  (90 Base) MCG/ACT inhaler, Inhale 2 puffs Every 4 (Four) Hours As Needed., Disp: , Rfl:   •  atorvastatin (LIPITOR) 40 MG tablet, Take 1 tablet by mouth Every Night., Disp: 90 tablet, Rfl: 1  •  clobetasol (TEMOVATE) 0.05 % cream, , Disp: , Rfl: 5  •  cyclobenzaprine (FLEXERIL) 10 MG tablet, , Disp: , Rfl:   •  gabapentin (NEURONTIN) 800 MG tablet, , Disp: , Rfl:   •  HYDROcodone-acetaminophen (NORCO)  MG per tablet, , Disp: , Rfl:   •  metFORMIN ER (GLUCOPHAGE-XR) 500 MG 24 hr tablet, Take 2 tablets by mouth 2 (Two) Times a Day., Disp: 360 tablet, Rfl: 1  •  Unable to find, , Disp: , Rfl:   •  varenicline (CHANTIX STEFANIA) 0.5 MG X 11 & 1 MG X 42 tablet, Take 0.5 mg po daily x 3 days, then 0.5 mg po bid x 4 days, then 1 mg po bid, Disp: 53 tablet, Rfl: 0    Objective     Vitals:    11/16/20 1421   BP: 124/78   Pulse: 104   Temp: 98 °F (36.7 °C)   SpO2: 98%   Weight: 60.9 kg (134 lb 3.2 oz)   Height: 152.4 cm (60\")       Body mass index is 26.21 kg/m².    No components found for: 2D    Physical Exam  Vitals signs and nursing note reviewed.   Constitutional:       Appearance: Normal appearance. She is well-developed.   HENT:      Head: Normocephalic and atraumatic.      Nose: Nose normal.   Eyes:      General: No scleral icterus.     Conjunctiva/sclera: Conjunctivae normal.   Neck:      Musculoskeletal: Normal range of motion and neck supple.   Cardiovascular:      Rate and Rhythm: Normal rate and regular rhythm.      Heart sounds: Normal heart sounds.   Pulmonary:      Effort: Pulmonary effort is normal.      Breath sounds: Normal breath sounds.   Musculoskeletal:      Right lower leg: No edema.      Left lower leg: No " edema.   Skin:     General: Skin is warm and dry.      Findings: No rash.   Neurological:      General: No focal deficit present.      Mental Status: She is alert and oriented to person, place, and time.   Psychiatric:         Mood and Affect: Mood normal.         Behavior: Behavior normal.         Thought Content: Thought content normal.         Judgment: Judgment normal.         Procedures    Assessment/Plan   Diagnoses and all orders for this visit:    1. Type 2 diabetes mellitus without complication, without long-term current use of insulin (CMS/[facility]) (Primary)  -     POC Glycosylated Hemoglobin (Hb A1C)  -     metFORMIN ER (GLUCOPHAGE-XR) 500 MG 24 hr tablet; Take 2 tablets by mouth 2 (Two) Times a Day.  Dispense: 360 tablet; Refill: 1    2. Mixed hyperlipidemia  -     atorvastatin (LIPITOR) 40 MG tablet; Take 1 tablet by mouth Every Night.  Dispense: 90 tablet; Refill: 1    3. Encounter for long-term (current) use of medications  -     CBC & Differential  -     Comprehensive Metabolic Panel    Other orders  -     varenicline (CHANTIX STEFANIA) 0.5 MG X 11 & 1 MG X 42 tablet; Take 0.5 mg po daily x 3 days, then 0.5 mg po bid x 4 days, then 1 mg po bid  Dispense: 53 tablet; Refill: 0    Surveillance labs were obtained today and any medication changes will be made based on lab results and will be called to the patient later this week.    Patient Instructions   Steps to Quit Smoking  Smoking tobacco is the leading cause of preventable death. It can affect almost every organ in the body. Smoking puts you and those around you at risk for developing many serious chronic diseases. Quitting smoking can be difficult, but it is one of the best things that you can do for your health. It is never too late to quit.  How do I get ready to quit?  When you decide to quit smoking, create a plan to help you succeed. Before you quit:  · Pick a date to quit. Set a date within the next 2 weeks to give you time to prepare.  · Write down  the reasons why you are quitting. Keep this list in places where you will see it often.  · Tell your family, friends, and co-workers that you are quitting. Support from your loved ones can make quitting easier.  · Talk with your health care provider about your options for quitting smoking.  · Find out what treatment options are covered by your health insurance.  · Identify people, places, things, and activities that make you want to smoke (triggers). Avoid them.  What first steps can I take to quit smoking?  · Throw away all cigarettes at home, at work, and in your car.  · Throw away smoking accessories, such as ashtrays and lighters.  · Clean your car. Make sure to empty the ashtray.  · Clean your home, including curtains and carpets.  What strategies can I use to quit smoking?  Talk with your health care provider about combining strategies, such as taking medicines while you are also receiving in-person counseling. Using these two strategies together makes you more likely to succeed in quitting than if you used either strategy on its own.  · If you are pregnant or breastfeeding, talk with your health care provider about finding counseling or other support strategies to quit smoking. Do not take medicine to help you quit smoking unless your health care provider tells you to do so.  To quit smoking:  Quit right away  · Quit smoking completely, instead of gradually reducing how much you smoke over a period of time. Research shows that stopping smoking right away is more successful than gradually quitting.  · Attend in-person counseling to help you build problem-solving skills. You are more likely to succeed in quitting if you attend counseling sessions regularly. Even short sessions of 10 minutes can be effective.  Take medicine  You may take medicines to help you quit smoking. Some medicines require a prescription and some you can purchase over-the-counter. Medicines may have nicotine in them to replace the nicotine  in cigarettes. Medicines may:  · Help to stop cravings.  · Help to relieve withdrawal symptoms.  Your health care provider may recommend:  · Nicotine patches, gum, or lozenges.  · Nicotine inhalers or sprays.  · Non-nicotine medicine that is taken by mouth.  Find resources  Find resources and support systems that can help you to quit smoking and remain smoke-free after you quit. These resources are most helpful when you use them often. They include:  · Online chats with a counselor.  · Telephone quitlines.  · Printed self-help materials.  · Support groups or group counseling.  · Text messaging programs.  · Mobile phone apps or applications. Use apps that can help you stick to your quit plan by providing reminders, tips, and encouragement. There are many free apps for mobile devices as well as websites. Examples include Quit Guide from the CDC and smokefree.gov  What things can I do to make it easier to quit?    · Reach out to your family and friends for support and encouragement. Call telephone quitlines (1-800-QUIT-NOW), reach out to support groups, or work with a counselor for support.  · Ask people who smoke to avoid smoking around you.  · Avoid places that trigger you to smoke, such as bars, parties, or smoke-break areas at work.  · Spend time with people who do not smoke.  · Lessen the stress in your life. Stress can be a smoking trigger for some people. To lessen stress, try:  ? Exercising regularly.  ? Doing deep-breathing exercises.  ? Doing yoga.  ? Meditating.  ? Performing a body scan. This involves closing your eyes, scanning your body from head to toe, and noticing which parts of your body are particularly tense. Try to relax the muscles in those areas.  How will I feel when I quit smoking?  Day 1 to 3 weeks  Within the first 24 hours of quitting smoking, you may start to feel withdrawal symptoms. These symptoms are usually most noticeable 2-3 days after quitting, but they usually do not last for more  than 2-3 weeks. You may experience these symptoms:  · Mood swings.  · Restlessness, anxiety, or irritability.  · Trouble concentrating.  · Dizziness.  · Strong cravings for sugary foods and nicotine.  · Mild weight gain.  · Constipation.  · Nausea.  · Coughing or a sore throat.  · Changes in how the medicines that you take for unrelated issues work in your body.  · Depression.  · Trouble sleeping (insomnia).  Week 3 and afterward  After the first 2-3 weeks of quitting, you may start to notice more positive results, such as:  · Improved sense of smell and taste.  · Decreased coughing and sore throat.  · Slower heart rate.  · Lower blood pressure.  · Clearer skin.  · The ability to breathe more easily.  · Fewer sick days.  Quitting smoking can be very challenging. Do not get discouraged if you are not successful the first time. Some people need to make many attempts to quit before they achieve long-term success. Do your best to stick to your quit plan, and talk with your health care provider if you have any questions or concerns.  Summary  · Smoking tobacco is the leading cause of preventable death. Quitting smoking is one of the best things that you can do for your health.  · When you decide to quit smoking, create a plan to help you succeed.  · Quit smoking right away, not slowly over a period of time.  · When you start quitting, seek help from your health care provider, family, or friends.  This information is not intended to replace advice given to you by your health care provider. Make sure you discuss any questions you have with your health care provider.  Document Released: 12/12/2002 Document Revised: 09/11/2020 Document Reviewed: 03/07/2020  Elsevier Patient Education © 2020 [employer]  Coping with Quitting Smoking    Quitting smoking is a physical and mental challenge. You will face cravings, withdrawal symptoms, and temptation. Before quitting, work with your health care provider to make a plan that can  help you cope. Preparation can help you quit and keep you from giving in.  How can I cope with cravings?  Cravings usually last for 5-10 minutes. If you get through it, the craving will pass. Consider taking the following actions to help you cope with cravings:  · Keep your mouth busy:  ? Chew sugar-free gum.  ? Suck on hard candies or a straw.  ? Brush your teeth.  · Keep your hands and body busy:  ? Immediately change to a different activity when you feel a craving.  ? Squeeze or play with a ball.  ? Do an activity or a hobby, like making bead jewelry, practicing needlepoint, or working with wood.  ? Mix up your normal routine.  ? Take a short exercise break. Go for a quick walk or run up and down stairs.  ? Spend time in public places where smoking is not allowed.  · Focus on doing something kind or helpful for someone else.  · Call a friend or family member to talk during a craving.  · Join a support group.  · Call a quit line, such as [employer]IT[employer].  · Talk with your health care provider about medicines that might help you cope with cravings and make quitting easier for you.  How can I deal with withdrawal symptoms?  Your body may experience negative effects as it tries to get used to not having nicotine in the system. These effects are called withdrawal symptoms. They may include:  · Feeling hungrier than normal.  · Trouble concentrating.  · Irritability.  · Trouble sleeping.  · Feeling depressed.  · Restlessness and agitation.  · Craving a cigarette.  To manage withdrawal symptoms:  · Avoid places, people, and activities that trigger your cravings.  · Remember why you want to quit.  · Get plenty of sleep.  · Avoid coffee and other caffeinated drinks. These may worsen some of your symptoms.  How can I handle social situations?  Social situations can be difficult when you are quitting smoking, especially in the first few weeks. To manage this, you can:  · Avoid parties, bars, and other social situations where  people might be smoking.  · Avoid alcohol.  · Leave right away if you have the urge to smoke.  · Explain to your family and friends that you are quitting smoking. Ask for understanding and support.  · Plan activities with friends or family where smoking is not an option.  What are some ways I can cope with stress?  Wanting to smoke may cause stress, and stress can make you want to smoke. Find ways to manage your stress. Relaxation techniques can help. For example:  · Breathe slowly and deeply, in through your nose and out through your mouth.  · Listen to soothing, relaxing music.  · Talk with a family member or friend about your stress.  · Light a candle.  · Soak in a bath or take a shower.  · Think about a peaceful place.  What are some ways I can prevent weight gain?  Be aware that many people gain weight after they quit smoking. However, not everyone does. To keep from gaining weight, have a plan in place before you quit and stick to the plan after you quit. Your plan should include:  · Having healthy snacks. When you have a craving, it may help to:  ? Eat plain popcorn, crunchy carrots, celery, or other cut vegetables.  ? Chew sugar-free gum.  · Changing how you eat:  ? Eat small portion sizes at meals.  ? Eat 4-6 small meals throughout the day instead of 1-2 large meals a day.  ? Be mindful when you eat. Do not watch television or do other things that might distract you as you eat.  · Exercising regularly:  ? Make time to exercise each day. If you do not have time for a long workout, do short bouts of exercise for 5-10 minutes several times a day.  ? Do some form of strengthening exercise, like weight lifting, and some form of aerobic exercise, like running or swimming.  · Drinking plenty of water or other low-calorie or no-calorie drinks. Drink 6-8 glasses of water daily, or as much as instructed by your health care provider.  Summary  · Quitting smoking is a physical and mental challenge. You will face  cravings, withdrawal symptoms, and temptation to smoke again. Preparation can help you as you go through these challenges.  · You can cope with cravings by keeping your mouth busy (such as by chewing gum), keeping your body and hands busy, and making calls to family, friends, or a helpline for people who want to quit smoking.  · You can cope with withdrawal symptoms by avoiding places where people smoke, avoiding drinks with caffeine, and getting plenty of rest.  · Ask your health care provider about the different ways to prevent weight gain, avoid stress, and handle social situations.  This information is not intended to replace advice given to you by your health care provider. Make sure you discuss any questions you have with your health care provider.  Document Released: 12/15/2017 Document Revised: 11/30/2018 Document Reviewed: 12/15/2017  Elsevier Patient Education © 2020 [employer]

## 2020-11-16 NOTE — PATIENT INSTRUCTIONS
Steps to Quit Smoking  Smoking tobacco is the leading cause of preventable death. It can affect almost every organ in the body. Smoking puts you and those around you at risk for developing many serious chronic diseases. Quitting smoking can be difficult, but it is one of the best things that you can do for your health. It is never too late to quit.  How do I get ready to quit?  When you decide to quit smoking, create a plan to help you succeed. Before you quit:  · Pick a date to quit. Set a date within the next 2 weeks to give you time to prepare.  · Write down the reasons why you are quitting. Keep this list in places where you will see it often.  · Tell your family, friends, and co-workers that you are quitting. Support from your loved ones can make quitting easier.  · Talk with your health care provider about your options for quitting smoking.  · Find out what treatment options are covered by your health insurance.  · Identify people, places, things, and activities that make you want to smoke (triggers). Avoid them.  What first steps can I take to quit smoking?  · Throw away all cigarettes at home, at work, and in your car.  · Throw away smoking accessories, such as ashtrays and lighters.  · Clean your car. Make sure to empty the ashtray.  · Clean your home, including curtains and carpets.  What strategies can I use to quit smoking?  Talk with your health care provider about combining strategies, such as taking medicines while you are also receiving in-person counseling. Using these two strategies together makes you more likely to succeed in quitting than if you used either strategy on its own.  · If you are pregnant or breastfeeding, talk with your health care provider about finding counseling or other support strategies to quit smoking. Do not take medicine to help you quit smoking unless your health care provider tells you to do so.  To quit smoking:  Quit right away  · Quit smoking completely, instead of  gradually reducing how much you smoke over a period of time. Research shows that stopping smoking right away is more successful than gradually quitting.  · Attend in-person counseling to help you build problem-solving skills. You are more likely to succeed in quitting if you attend counseling sessions regularly. Even short sessions of 10 minutes can be effective.  Take medicine  You may take medicines to help you quit smoking. Some medicines require a prescription and some you can purchase over-the-counter. Medicines may have nicotine in them to replace the nicotine in cigarettes. Medicines may:  · Help to stop cravings.  · Help to relieve withdrawal symptoms.  Your health care provider may recommend:  · Nicotine patches, gum, or lozenges.  · Nicotine inhalers or sprays.  · Non-nicotine medicine that is taken by mouth.  Find resources  Find resources and support systems that can help you to quit smoking and remain smoke-free after you quit. These resources are most helpful when you use them often. They include:  · Online chats with a counselor.  · Telephone quitlines.  · Printed self-help materials.  · Support groups or group counseling.  · Text messaging programs.  · Mobile phone apps or applications. Use apps that can help you stick to your quit plan by providing reminders, tips, and encouragement. There are many free apps for mobile devices as well as websites. Examples include Quit Guide from the CDC and smokefree.gov  What things can I do to make it easier to quit?    · Reach out to your family and friends for support and encouragement. Call telephone quitlines (0-337-QUIT-NOW), reach out to support groups, or work with a counselor for support.  · Ask people who smoke to avoid smoking around you.  · Avoid places that trigger you to smoke, such as bars, parties, or smoke-break areas at work.  · Spend time with people who do not smoke.  · Lessen the stress in your life. Stress can be a smoking trigger for some  people. To lessen stress, try:  ? Exercising regularly.  ? Doing deep-breathing exercises.  ? Doing yoga.  ? Meditating.  ? Performing a body scan. This involves closing your eyes, scanning your body from head to toe, and noticing which parts of your body are particularly tense. Try to relax the muscles in those areas.  How will I feel when I quit smoking?  Day 1 to 3 weeks  Within the first 24 hours of quitting smoking, you may start to feel withdrawal symptoms. These symptoms are usually most noticeable 2-3 days after quitting, but they usually do not last for more than 2-3 weeks. You may experience these symptoms:  · Mood swings.  · Restlessness, anxiety, or irritability.  · Trouble concentrating.  · Dizziness.  · Strong cravings for sugary foods and nicotine.  · Mild weight gain.  · Constipation.  · Nausea.  · Coughing or a sore throat.  · Changes in how the medicines that you take for unrelated issues work in your body.  · Depression.  · Trouble sleeping (insomnia).  Week 3 and afterward  After the first 2-3 weeks of quitting, you may start to notice more positive results, such as:  · Improved sense of smell and taste.  · Decreased coughing and sore throat.  · Slower heart rate.  · Lower blood pressure.  · Clearer skin.  · The ability to breathe more easily.  · Fewer sick days.  Quitting smoking can be very challenging. Do not get discouraged if you are not successful the first time. Some people need to make many attempts to quit before they achieve long-term success. Do your best to stick to your quit plan, and talk with your health care provider if you have any questions or concerns.  Summary  · Smoking tobacco is the leading cause of preventable death. Quitting smoking is one of the best things that you can do for your health.  · When you decide to quit smoking, create a plan to help you succeed.  · Quit smoking right away, not slowly over a period of time.  · When you start quitting, seek help from your  health care provider, family, or friends.  This information is not intended to replace advice given to you by your health care provider. Make sure you discuss any questions you have with your health care provider.  Document Released: 12/12/2002 Document Revised: 09/11/2020 Document Reviewed: 03/07/2020  Elsevier Patient Education © 2020 [employer]  Coping with Quitting Smoking    Quitting smoking is a physical and mental challenge. You will face cravings, withdrawal symptoms, and temptation. Before quitting, work with your health care provider to make a plan that can help you cope. Preparation can help you quit and keep you from giving in.  How can I cope with cravings?  Cravings usually last for 5-10 minutes. If you get through it, the craving will pass. Consider taking the following actions to help you cope with cravings:  · Keep your mouth busy:  ? Chew sugar-free gum.  ? Suck on hard candies or a straw.  ? Brush your teeth.  · Keep your hands and body busy:  ? Immediately change to a different activity when you feel a craving.  ? Squeeze or play with a ball.  ? Do an activity or a hobby, like making bead jewelry, practicing needlepoint, or working with wood.  ? Mix up your normal routine.  ? Take a short exercise break. Go for a quick walk or run up and down stairs.  ? Spend time in public places where smoking is not allowed.  · Focus on doing something kind or helpful for someone else.  · Call a friend or family member to talk during a craving.  · Join a support group.  · Call a quit line, such as 1-800-QUIT-NOW.  · Talk with your health care provider about medicines that might help you cope with cravings and make quitting easier for you.  How can I deal with withdrawal symptoms?  Your body may experience negative effects as it tries to get used to not having nicotine in the system. These effects are called withdrawal symptoms. They may include:  · Feeling hungrier than normal.  · Trouble  concentrating.  · Irritability.  · Trouble sleeping.  · Feeling depressed.  · Restlessness and agitation.  · Craving a cigarette.  To manage withdrawal symptoms:  · Avoid places, people, and activities that trigger your cravings.  · Remember why you want to quit.  · Get plenty of sleep.  · Avoid coffee and other caffeinated drinks. These may worsen some of your symptoms.  How can I handle social situations?  Social situations can be difficult when you are quitting smoking, especially in the first few weeks. To manage this, you can:  · Avoid parties, bars, and other social situations where people might be smoking.  · Avoid alcohol.  · Leave right away if you have the urge to smoke.  · Explain to your family and friends that you are quitting smoking. Ask for understanding and support.  · Plan activities with friends or family where smoking is not an option.  What are some ways I can cope with stress?  Wanting to smoke may cause stress, and stress can make you want to smoke. Find ways to manage your stress. Relaxation techniques can help. For example:  · Breathe slowly and deeply, in through your nose and out through your mouth.  · Listen to soothing, relaxing music.  · Talk with a family member or friend about your stress.  · Light a candle.  · Soak in a bath or take a shower.  · Think about a peaceful place.  What are some ways I can prevent weight gain?  Be aware that many people gain weight after they quit smoking. However, not everyone does. To keep from gaining weight, have a plan in place before you quit and stick to the plan after you quit. Your plan should include:  · Having healthy snacks. When you have a craving, it may help to:  ? Eat plain popcorn, crunchy carrots, celery, or other cut vegetables.  ? Chew sugar-free gum.  · Changing how you eat:  ? Eat small portion sizes at meals.  ? Eat 4-6 small meals throughout the day instead of 1-2 large meals a day.  ? Be mindful when you eat. Do not watch  television or do other things that might distract you as you eat.  · Exercising regularly:  ? Make time to exercise each day. If you do not have time for a long workout, do short bouts of exercise for 5-10 minutes several times a day.  ? Do some form of strengthening exercise, like weight lifting, and some form of aerobic exercise, like running or swimming.  · Drinking plenty of water or other low-calorie or no-calorie drinks. Drink 6-8 glasses of water daily, or as much as instructed by your health care provider.  Summary  · Quitting smoking is a physical and mental challenge. You will face cravings, withdrawal symptoms, and temptation to smoke again. Preparation can help you as you go through these challenges.  · You can cope with cravings by keeping your mouth busy (such as by chewing gum), keeping your body and hands busy, and making calls to family, friends, or a helpline for people who want to quit smoking.  · You can cope with withdrawal symptoms by avoiding places where people smoke, avoiding drinks with caffeine, and getting plenty of rest.  · Ask your health care provider about the different ways to prevent weight gain, avoid stress, and handle social situations.  This information is not intended to replace advice given to you by your health care provider. Make sure you discuss any questions you have with your health care provider.  Document Released: 12/15/2017 Document Revised: 11/30/2018 Document Reviewed: 12/15/2017  [employer] Patient Education © 2020 [employer]

## 2020-11-17 LAB
ALBUMIN SERPL-MCNC: 4.7 G/DL (ref 3.8–4.9)
ALBUMIN/GLOB SERPL: 1.9 {RATIO} (ref 1.2–2.2)
ALP SERPL-CCNC: 76 IU/L (ref 39–117)
ALT SERPL-CCNC: 23 IU/L (ref 0–32)
AST SERPL-CCNC: 24 IU/L (ref 0–40)
BASOPHILS # BLD AUTO: 0 X10E3/UL (ref 0–0.2)
BASOPHILS NFR BLD AUTO: 0 %
BILIRUB SERPL-MCNC: 0.4 MG/DL (ref 0–1.2)
BUN SERPL-MCNC: 7 MG/DL (ref 6–24)
BUN/CREAT SERPL: 13 (ref 9–23)
CALCIUM SERPL-MCNC: 9.8 MG/DL (ref 8.7–10.2)
CHLORIDE SERPL-SCNC: 101 MMOL/L (ref 96–106)
CO2 SERPL-SCNC: 23 MMOL/L (ref 20–29)
CREAT SERPL-MCNC: 0.56 MG/DL (ref 0.57–1)
EOSINOPHIL # BLD AUTO: 0 X10E3/UL (ref 0–0.4)
EOSINOPHIL NFR BLD AUTO: 0 %
ERYTHROCYTE [DISTWIDTH] IN BLOOD BY AUTOMATED COUNT: 13.2 % (ref 11.7–15.4)
GLOBULIN SER CALC-MCNC: 2.5 G/DL (ref 1.5–4.5)
GLUCOSE SERPL-MCNC: 100 MG/DL (ref 65–99)
HCT VFR BLD AUTO: 45.1 % (ref 34–46.6)
HGB BLD-MCNC: 16 G/DL (ref 11.1–15.9)
IMM GRANULOCYTES # BLD AUTO: 0 X10E3/UL (ref 0–0.1)
IMM GRANULOCYTES NFR BLD AUTO: 0 %
LYMPHOCYTES # BLD AUTO: 4.8 X10E3/UL (ref 0.7–3.1)
LYMPHOCYTES NFR BLD AUTO: 51 %
MCH RBC QN AUTO: 32.9 PG (ref 26.6–33)
MCHC RBC AUTO-ENTMCNC: 35.5 G/DL (ref 31.5–35.7)
MCV RBC AUTO: 93 FL (ref 79–97)
MONOCYTES # BLD AUTO: 0.5 X10E3/UL (ref 0.1–0.9)
MONOCYTES NFR BLD AUTO: 5 %
NEUTROPHILS # BLD AUTO: 4.2 X10E3/UL (ref 1.4–7)
NEUTROPHILS NFR BLD AUTO: 44 %
PLATELET # BLD AUTO: 251 X10E3/UL (ref 150–450)
POTASSIUM SERPL-SCNC: 4.2 MMOL/L (ref 3.5–5.2)
PROT SERPL-MCNC: 7.2 G/DL (ref 6–8.5)
RBC # BLD AUTO: 4.86 X10E6/UL (ref 3.77–5.28)
SODIUM SERPL-SCNC: 141 MMOL/L (ref 134–144)
WBC # BLD AUTO: 9.6 X10E3/UL (ref 3.4–10.8)

## 2021-02-16 ENCOUNTER — PATIENT OUTREACH (OUTPATIENT)
Dept: PHARMACY | Facility: HOSPITAL | Age: 56
End: 2021-02-16

## 2021-02-16 NOTE — OUTREACH NOTE
I spoke with the patient today regarding medication adherence to atorvastatin. No issues or questions at this time.    Ciera Butler, PharmD  02/16/21

## 2021-03-09 DIAGNOSIS — E11.9 TYPE 2 DIABETES MELLITUS WITHOUT COMPLICATION, WITHOUT LONG-TERM CURRENT USE OF INSULIN (HCC): ICD-10-CM

## 2021-03-09 DIAGNOSIS — E78.2 MIXED HYPERLIPIDEMIA: ICD-10-CM

## 2021-03-10 RX ORDER — ATORVASTATIN CALCIUM 40 MG/1
TABLET, FILM COATED ORAL
Qty: 90 TABLET | Refills: 0 | Status: SHIPPED | OUTPATIENT
Start: 2021-03-10 | End: 2021-10-28

## 2021-03-10 RX ORDER — METFORMIN HYDROCHLORIDE 500 MG/1
TABLET, EXTENDED RELEASE ORAL
Qty: 360 TABLET | Refills: 0 | Status: SHIPPED | OUTPATIENT
Start: 2021-03-10 | End: 2021-10-28

## 2021-03-25 ENCOUNTER — OFFICE VISIT (OUTPATIENT)
Dept: FAMILY MEDICINE CLINIC | Facility: CLINIC | Age: 56
End: 2021-03-25

## 2021-03-25 VITALS
HEART RATE: 89 BPM | BODY MASS INDEX: 24.66 KG/M2 | TEMPERATURE: 97.3 F | OXYGEN SATURATION: 98 % | WEIGHT: 125.6 LBS | DIASTOLIC BLOOD PRESSURE: 76 MMHG | SYSTOLIC BLOOD PRESSURE: 112 MMHG | HEIGHT: 60 IN | RESPIRATION RATE: 18 BRPM

## 2021-03-25 DIAGNOSIS — E78.2 MIXED HYPERLIPIDEMIA: Chronic | ICD-10-CM

## 2021-03-25 DIAGNOSIS — E11.9 TYPE 2 DIABETES MELLITUS WITHOUT COMPLICATION, WITHOUT LONG-TERM CURRENT USE OF INSULIN (HCC): Chronic | ICD-10-CM

## 2021-03-25 DIAGNOSIS — E11.9 ENCOUNTER FOR DIABETIC FOOT EXAM (HCC): ICD-10-CM

## 2021-03-25 DIAGNOSIS — Z11.59 NEED FOR HEPATITIS C SCREENING TEST: ICD-10-CM

## 2021-03-25 DIAGNOSIS — Z79.899 ENCOUNTER FOR LONG-TERM (CURRENT) USE OF MEDICATIONS: ICD-10-CM

## 2021-03-25 DIAGNOSIS — Z23 NEED FOR VACCINATION AGAINST STREPTOCOCCUS PNEUMONIAE: ICD-10-CM

## 2021-03-25 DIAGNOSIS — Z12.11 SCREENING FOR COLON CANCER: ICD-10-CM

## 2021-03-25 DIAGNOSIS — Z00.00 MEDICARE ANNUAL WELLNESS VISIT, SUBSEQUENT: Primary | ICD-10-CM

## 2021-03-25 LAB — HBA1C MFR BLD: 6.2 %

## 2021-03-25 PROCEDURE — 1126F AMNT PAIN NOTED NONE PRSNT: CPT | Performed by: FAMILY MEDICINE

## 2021-03-25 PROCEDURE — G0009 ADMIN PNEUMOCOCCAL VACCINE: HCPCS | Performed by: FAMILY MEDICINE

## 2021-03-25 PROCEDURE — G0439 PPPS, SUBSEQ VISIT: HCPCS | Performed by: FAMILY MEDICINE

## 2021-03-25 PROCEDURE — 90732 PPSV23 VACC 2 YRS+ SUBQ/IM: CPT | Performed by: FAMILY MEDICINE

## 2021-03-25 PROCEDURE — 83036 HEMOGLOBIN GLYCOSYLATED A1C: CPT | Performed by: FAMILY MEDICINE

## 2021-03-25 PROCEDURE — 99214 OFFICE O/P EST MOD 30 MIN: CPT | Performed by: FAMILY MEDICINE

## 2021-03-25 PROCEDURE — 1159F MED LIST DOCD IN RCRD: CPT | Performed by: FAMILY MEDICINE

## 2021-03-25 RX ORDER — GABAPENTIN 600 MG/1
TABLET ORAL
COMMUNITY
Start: 2021-03-09

## 2021-03-25 NOTE — PATIENT INSTRUCTIONS
Medicare Wellness  Personal Prevention Plan of Service     Date of Office Visit:  2021  Encounter Provider:  Diana Bauer DO  Place of Service:  [facility]  Patient Name: Fang Kunz  :  1965    As part of the Medicare Wellness portion of your visit today, we are providing you with this personalized preventive plan of services (PPPS). This plan is based upon recommendations of the [place] Preventive Services Task Force (USPSTF) and the Advisory Committee on Immunization Practices (ACIP).    This lists the preventive care services that should be considered, and provides dates of when you are due. Items listed as completed are up-to-date and do not require any further intervention.    Health Maintenance   Topic Date Due   • Pneumococcal Vaccine 0-64 (1 of 1 - PPSV23) Never done   • Hepatitis B (1 of 3 - Risk 3-dose series) Never done   • TDAP/TD VACCINES (1 - Tdap) Never done   • ZOSTER VACCINE (1 of 2) Never done   • HEPATITIS C SCREENING  Never done   • ANNUAL WELLNESS VISIT  Never done   • DIABETIC EYE EXAM  2020   • COLOGUARD  Never done   • LIPID PANEL  2021   • URINE MICROALBUMIN  2021   • HEMOGLOBIN A1C  2021   • DIABETIC FOOT EXAM  2022   • MAMMOGRAM  2023   • INFLUENZA VACCINE  Completed   • MENINGOCOCCAL VACCINE  Aged Out   • PAP SMEAR  Discontinued       Orders Placed This Encounter   Procedures   • Pneumococcal Polysaccharide Vaccine 23-Valent Greater Than or Equal To 3yo Subcutaneous / IM   • Cologuard - Stool, Per Rectum     Standing Status:   Future     Standing Expiration Date:   3/25/2022   • Hepatitis C Antibody   • Comprehensive Metabolic Panel   • POC Glycosylated Hemoglobin (Hb A1C)   • CBC & Differential     Order Specific Question:   Manual Differential     Answer:   No       Return in about 6 months (around 2021) for Diabetes, Hyperlipidemia..

## 2021-03-25 NOTE — PROGRESS NOTES
The ABCs of the Annual Wellness Visit  Subsequent Medicare Wellness Visit    Chief Complaint   Patient presents with   • Medicare Wellness-subsequent     due for A1c     Patient is also here today to follow-up on her chronic medical conditions:    Hyperlipidemia.  The patient is compliant with taking atorvastatin 40 mg daily and denies any side effects.  Cholesterol was at goal September 2020    Diabetes.  The patient is compliant with taking metformin 500 mg twice daily as directed.  She denies any side effects.  Patient states that her home blood sugar readings have been running a little better lately    Reflux.  The patient takes Tums as needed symptoms.  She states that she gets symptoms about once maybe twice a week at the most.    Subjective   History of Present Illness:  Fang Kunz is a 55 y.o. female who presents for a Subsequent Medicare Wellness Visit.    HEALTH RISK ASSESSMENT    Recent Hospitalizations:  No hospitalization(s) within the last year.    Current Medical Providers:  Patient Care Team:  Diana Bauer DO as PCP - General (Family Medicine)    Smoking Status:  Social History     Tobacco Use   Smoking Status Current Every Day Smoker   Smokeless Tobacco Never Used   Tobacco Comment    1/2 PPD x 15+ years       Alcohol Consumption:  Social History     Substance and Sexual Activity   Alcohol Use Yes    Comment: rare       Depression Screen:   PHQ-2/PHQ-9 Depression Screening 3/25/2021   Little interest or pleasure in doing things 0   Feeling down, depressed, or hopeless 0   Total Score 0       Fall Risk Screen:  LAURENADI Fall Risk Assessment has not been completed.    Health Habits and Functional and Cognitive Screening:  Functional & Cognitive Status 3/25/2021   Do you have difficulty preparing food and eating? No   Do you have difficulty bathing yourself, getting dressed or grooming yourself? No   Do you have difficulty using the toilet? Yes   Do you have difficulty moving around from place  to place? No   Do you have trouble with steps or getting out of a bed or a chair? Yes   Current Diet Well Balanced Diet   Dental Exam Up to date   Eye Exam Up to date   Exercise (times per week) 7 times per week   Current Exercise Activities Include Walking   Do you need help using the phone?  No   Are you deaf or do you have serious difficulty hearing?  No   Do you need help with transportation? No   Do you need help shopping? No   Do you need help preparing meals?  No   Do you need help with housework?  Yes   Do you need help with laundry? Yes   Do you need help taking your medications? No   Do you need help managing money? No   Do you ever drive or ride in a car without wearing a seat belt? No   Have you felt unusual stress, anger or loneliness in the last month? No   Who do you live with? Alone   If you need help, do you have trouble finding someone available to you? No   Have you been bothered in the last four weeks by sexual problems? No   Do you have difficulty concentrating, remembering or making decisions? No         Does the patient have evidence of cognitive impairment? No    Asprin use counseling:Does not need ASA (and currently is not on it)    Age-appropriate Screening Schedule:  Refer to the list below for future screening recommendations based on patient's age, sex and/or medical conditions. Orders for these recommended tests are listed in the plan section. The patient has been provided with a written plan.    Health Maintenance   Topic Date Due   • TDAP/TD VACCINES (1 - Tdap) Never done   • ZOSTER VACCINE (1 of 2) Never done   • DIABETIC EYE EXAM  05/13/2021 (Originally 12/18/2020)   • LIPID PANEL  08/13/2021   • URINE MICROALBUMIN  08/13/2021   • HEMOGLOBIN A1C  09/25/2021   • DIABETIC FOOT EXAM  03/25/2022   • MAMMOGRAM  03/17/2023   • INFLUENZA VACCINE  Completed   • PAP SMEAR  Discontinued          The following portions of the patient's history were reviewed and updated as appropriate:  allergies, current medications, past family history, past medical history, past social history, past surgical history and problem list.    Outpatient Medications Prior to Visit   Medication Sig Dispense Refill   • albuterol sulfate  (90 Base) MCG/ACT inhaler Inhale 2 puffs Every 4 (Four) Hours As Needed.     • atorvastatin (LIPITOR) 40 MG tablet TAKE ONE TABLET BY MOUTH EVERY NIGHT 90 tablet 0   • clobetasol (TEMOVATE) 0.05 % cream   5   • cyclobenzaprine (FLEXERIL) 10 MG tablet      • gabapentin (NEURONTIN) 600 MG tablet TAKE ONE TABLET BY MOUTH EVERY 6 HOURS FOR 30 DAYS     • HYDROcodone-acetaminophen (NORCO)  MG per tablet      • metFORMIN ER (GLUCOPHAGE-XR) 500 MG 24 hr tablet TAKE TWO TABLETS BY MOUTH TWICE DAILY 360 tablet 0   • gabapentin (NEURONTIN) 800 MG tablet      • Unable to find        No facility-administered medications prior to visit.       Patient Active Problem List   Diagnosis   • Hyperlipidemia   • GERD without esophagitis   • Type 2 diabetes mellitus without complication, without long-term current use of insulin (CMS/[facility])   • Encounter for long-term (current) use of medications       Advanced Care Planning:  ACP discussion was held with the patient during this visit. Patient does not have an advance directive, information provided.    Review of Systems   Constitutional: Negative for activity change, appetite change, fatigue and unexpected weight change.   HENT: Negative for congestion, ear pain, nosebleeds, sore throat and tinnitus.    Eyes: Negative for pain, redness and visual disturbance.   Respiratory: Negative for cough, shortness of breath and wheezing.    Cardiovascular: Negative for chest pain, palpitations and leg swelling.   Gastrointestinal: Negative for abdominal pain, blood in stool and nausea.   Endocrine: Negative for polydipsia and polyuria.   Genitourinary: Negative for dysuria, frequency, menstrual problem and vaginal discharge.   Musculoskeletal: Negative for  "arthralgias, joint swelling and myalgias.   Skin: Negative for rash.   Allergic/Immunologic: Negative for environmental allergies, food allergies and immunocompromised state.   Neurological: Negative for dizziness, speech difficulty, weakness and headaches.   Hematological: Negative for adenopathy. Does not bruise/bleed easily.   Psychiatric/Behavioral: Negative for decreased concentration and dysphoric mood. The patient is not nervous/anxious.        Compared to one year ago, the patient feels her physical health is the same.  Compared to one year ago, the patient feels her mental health is the same.    Reviewed chart for potential of high risk medication in the elderly: yes  Reviewed chart for potential of harmful drug interactions in the elderly:yes    Objective         Vitals:    03/25/21 0806   BP: 112/76   BP Location: Left arm   Patient Position: Sitting   Cuff Size: Adult   Pulse: 89   Resp: 18   Temp: 97.3 °F (36.3 °C)   TempSrc: Temporal   SpO2: 98%   Weight: 57 kg (125 lb 9.6 oz)   Height: 152.4 cm (60\")   PainSc: 0-No pain       Body mass index is 24.53 kg/m².  Discussed the patient's BMI with her. The BMI is in the acceptable range.    Physical Exam  Vitals and nursing note reviewed.   Constitutional:       Appearance: Normal appearance. She is well-developed.   HENT:      Head: Normocephalic and atraumatic.   Eyes:      General: No scleral icterus.     Conjunctiva/sclera: Conjunctivae normal.   Cardiovascular:      Rate and Rhythm: Normal rate and regular rhythm.      Heart sounds: Normal heart sounds.   Pulmonary:      Effort: Pulmonary effort is normal.      Breath sounds: Normal breath sounds.   Abdominal:      General: Bowel sounds are normal.      Palpations: Abdomen is soft.   Musculoskeletal:         General: Normal range of motion.      Cervical back: Normal range of motion and neck supple.   Skin:     General: Skin is warm and dry.      Capillary Refill: Capillary refill takes less than 2 " seconds.      Findings: No rash.   Neurological:      Mental Status: She is alert and oriented to person, place, and time.   Psychiatric:         Mood and Affect: Mood normal.         Behavior: Behavior normal.         Thought Content: Thought content normal.         Judgment: Judgment normal.         Lab Results   Component Value Date    HGBA1C 6.2 03/25/2021        Assessment/Plan   Medicare Risks and Personalized Health Plan  CMS Preventative Services Quick Reference  Advance Directive Discussion  Breast Cancer/Mammogram Screening  Cardiovascular risk  Chronic Pain   Colon Cancer Screening  Depression/Dysphoria  Diabetic Lab Screening   Fall Risk  Glaucoma Risk  Immunizations Discussed/Encouraged (specific immunizations; Hepatitis A Vaccine/Series, Hepatitis B Vaccine/Series, Influenza, Pneumococcal 23, Prevnar and Shingrix )  Lung Cancer Risk  Osteoprorosis Risk    The above risks/problems have been discussed with the patient.  Pertinent information has been shared with the patient in the After Visit Summary.  Follow up plans and orders are seen below in the Assessment/Plan Section.    Diagnoses and all orders for this visit:    1. Medicare annual wellness visit, subsequent (Primary)    2. Type 2 diabetes mellitus without complication, without long-term current use of insulin (CMS/[facility])  -     POC Glycosylated Hemoglobin (Hb A1C)  -     Comprehensive Metabolic Panel    3. Mixed hyperlipidemia    4. Screening for colon cancer  -     Cologuard - Stool, Per Rectum; Future    5. Encounter for diabetic foot exam (CMS/[facility])    6. Encounter for long-term (current) use of medications  -     CBC & Differential  -     Comprehensive Metabolic Panel    7. Need for hepatitis C screening test  -     Hepatitis C Antibody    8. Need for vaccination against Streptococcus pneumoniae  -     Pneumococcal Polysaccharide Vaccine 23-Valent Greater Than or Equal To 1yo Subcutaneous / IM    Patient is also here for chronic stable  diabetes, hyperlipidemia, and GERD.  Surveillance labs were obtained today and any medication changes will be made based on lab results and will be called to the patient later this week.    Follow Up:  Return in about 6 months (around 9/25/2021) for Diabetes, Hyperlipidemia..     An After Visit Summary and PPPS were given to the patient.

## 2021-03-26 LAB
ALBUMIN SERPL-MCNC: 4.4 G/DL (ref 3.5–5.2)
ALBUMIN/GLOB SERPL: 1.8 G/DL
ALP SERPL-CCNC: 67 U/L (ref 39–117)
ALT SERPL-CCNC: 20 U/L (ref 1–33)
AST SERPL-CCNC: 17 U/L (ref 1–32)
BASOPHILS # BLD AUTO: 0.04 10*3/MM3 (ref 0–0.2)
BASOPHILS NFR BLD AUTO: 0.5 % (ref 0–1.5)
BILIRUB SERPL-MCNC: 0.4 MG/DL (ref 0–1.2)
BUN SERPL-MCNC: 7 MG/DL (ref 6–20)
BUN/CREAT SERPL: 12.3 (ref 7–25)
CALCIUM SERPL-MCNC: 9.8 MG/DL (ref 8.6–10.5)
CHLORIDE SERPL-SCNC: 102 MMOL/L (ref 98–107)
CO2 SERPL-SCNC: 26.9 MMOL/L (ref 22–29)
CREAT SERPL-MCNC: 0.57 MG/DL (ref 0.57–1)
EOSINOPHIL # BLD AUTO: 0.09 10*3/MM3 (ref 0–0.4)
EOSINOPHIL NFR BLD AUTO: 1.1 % (ref 0.3–6.2)
ERYTHROCYTE [DISTWIDTH] IN BLOOD BY AUTOMATED COUNT: 12.4 % (ref 12.3–15.4)
GLOBULIN SER CALC-MCNC: 2.5 GM/DL
GLUCOSE SERPL-MCNC: 95 MG/DL (ref 65–99)
HCT VFR BLD AUTO: 42.2 % (ref 34–46.6)
HCV AB S/CO SERPL IA: <0.1 S/CO RATIO (ref 0–0.9)
HGB BLD-MCNC: 14.7 G/DL (ref 12–15.9)
IMM GRANULOCYTES # BLD AUTO: 0.03 10*3/MM3 (ref 0–0.05)
IMM GRANULOCYTES NFR BLD AUTO: 0.4 % (ref 0–0.5)
LYMPHOCYTES # BLD AUTO: 4.25 10*3/MM3 (ref 0.7–3.1)
LYMPHOCYTES NFR BLD AUTO: 51.5 % (ref 19.6–45.3)
MCH RBC QN AUTO: 32.9 PG (ref 26.6–33)
MCHC RBC AUTO-ENTMCNC: 34.8 G/DL (ref 31.5–35.7)
MCV RBC AUTO: 94.4 FL (ref 79–97)
MONOCYTES # BLD AUTO: 0.36 10*3/MM3 (ref 0.1–0.9)
MONOCYTES NFR BLD AUTO: 4.4 % (ref 5–12)
NEUTROPHILS # BLD AUTO: 3.48 10*3/MM3 (ref 1.7–7)
NEUTROPHILS NFR BLD AUTO: 42.1 % (ref 42.7–76)
NRBC BLD AUTO-RTO: 0 /100 WBC (ref 0–0.2)
PLATELET # BLD AUTO: 239 10*3/MM3 (ref 140–450)
POTASSIUM SERPL-SCNC: 4.4 MMOL/L (ref 3.5–5.2)
PROT SERPL-MCNC: 6.9 G/DL (ref 6–8.5)
RBC # BLD AUTO: 4.47 10*6/MM3 (ref 3.77–5.28)
SODIUM SERPL-SCNC: 140 MMOL/L (ref 136–145)
WBC # BLD AUTO: 8.25 10*3/MM3 (ref 3.4–10.8)

## 2021-09-26 NOTE — PROGRESS NOTES
"Chief Complaint  Diabetes    Subjective          Fang Kunz presents to [facility]  Patient is also here today to follow-up on her chronic medical conditions:    Hyperlipidemia.  The patient is compliant with taking atorvastatin 40 mg daily and denies any side effects.  Cholesterol was at goal September 2020    Diabetes.  The patient is compliant with taking metformin 500 mg twice daily as directed.  She denies any side effects.  Patient states that her home blood sugar readings have been running a little better lately      Objective   Vital Signs:   /82   Pulse 83   Temp 97.1 °F (36.2 °C)   Ht 152.4 cm (60\")   Wt 54.4 kg (120 lb)   SpO2 98%   BMI 23.44 kg/m²     Physical Exam  Vitals and nursing note reviewed.   Constitutional:       Appearance: Normal appearance. She is well-developed and normal weight.   HENT:      Head: Normocephalic and atraumatic.   Eyes:      General: No scleral icterus.     Conjunctiva/sclera: Conjunctivae normal.   Cardiovascular:      Rate and Rhythm: Normal rate and regular rhythm.      Heart sounds: Normal heart sounds.   Pulmonary:      Effort: Pulmonary effort is normal.      Breath sounds: Normal breath sounds.   Musculoskeletal:         General: Normal range of motion.      Cervical back: Normal range of motion and neck supple.      Right lower leg: No edema.      Left lower leg: No edema.   Skin:     General: Skin is warm and dry.      Capillary Refill: Capillary refill takes less than 2 seconds.      Findings: No rash.   Neurological:      Mental Status: She is alert and oriented to person, place, and time.   Psychiatric:         Behavior: Behavior normal.         Thought Content: Thought content normal.         Judgment: Judgment normal.        Result Review :   The following data was reviewed by: Diana Bauer DO on 09/27/2021:  Common labs    Common Labsle 11/16/20 11/16/20 11/16/20 3/25/21 3/25/21 3/25/21 9/27/21    9831 [id number] " 1512 0831 0851 0851    Glucose   100 (A)   95    BUN   7   7    Creatinine   0.56 (A)   0.57    eGFR Non African Am   105   110    eGFR  Am   121   133    Sodium   141   140    Potassium   4.2   4.4    Chloride   101   102    Calcium   9.8   9.8    Total Protein   7.2   6.9    Albumin   4.7   4.40    Total Bilirubin   0.4   0.4    Alkaline Phosphatase   76   67    AST (SGOT)   24   17    ALT (SGPT)   23   20    WBC  9.6   8.25     Hemoglobin  16.0 (A)   14.7     Hematocrit  45.1   42.2     Platelets  251   239     Hemoglobin A1C 6.6   6.2   5.9   (A) Abnormal value       Comments are available for some flowsheets but are not being displayed.                         Assessment and Plan    Diagnoses and all orders for this visit:    1. Type 2 diabetes mellitus without complication, without long-term current use of insulin (CMS/[facility]) (Primary)  -     POC Glycosylated Hemoglobin (Hb A1C)  -     POC Microalbumin  -     Comprehensive Metabolic Panel    2. Mixed hyperlipidemia  -     Lipid Panel    3. Encounter for long-term (current) use of medications  -     CBC & Differential  -     Comprehensive Metabolic Panel  -     Lipid Panel  -     TSH    4. Needs flu shot  -     FluLaval/Fluarix >6 Months ([id number])    Patient is seen today for chronic stable diabetes, and hyperlipidemia.  Surveillance labs were obtained today and any medication changes will be made based on lab results and will be called to the patient later this week.      Follow Up   Return in about 6 months (around 3/27/2022) for Medicare Wellness, Diabetes.  Patient was given instructions and counseling regarding her condition or for health maintenance advice. Please see specific information pulled into the AVS if appropriate.

## 2021-09-27 ENCOUNTER — OFFICE VISIT (OUTPATIENT)
Dept: FAMILY MEDICINE CLINIC | Facility: CLINIC | Age: 56
End: 2021-09-27

## 2021-09-27 VITALS
HEIGHT: 60 IN | DIASTOLIC BLOOD PRESSURE: 82 MMHG | BODY MASS INDEX: 23.56 KG/M2 | OXYGEN SATURATION: 98 % | HEART RATE: 83 BPM | TEMPERATURE: 97.1 F | WEIGHT: 120 LBS | SYSTOLIC BLOOD PRESSURE: 126 MMHG

## 2021-09-27 DIAGNOSIS — E11.9 TYPE 2 DIABETES MELLITUS WITHOUT COMPLICATION, WITHOUT LONG-TERM CURRENT USE OF INSULIN (HCC): Primary | ICD-10-CM

## 2021-09-27 DIAGNOSIS — E78.2 MIXED HYPERLIPIDEMIA: ICD-10-CM

## 2021-09-27 DIAGNOSIS — Z79.899 ENCOUNTER FOR LONG-TERM (CURRENT) USE OF MEDICATIONS: ICD-10-CM

## 2021-09-27 DIAGNOSIS — Z23 NEEDS FLU SHOT: ICD-10-CM

## 2021-09-27 LAB
HBA1C MFR BLD: 5.9 %
POC CREATININE URINE: 100
POC MICROALBUMIN URINE: 10

## 2021-09-27 PROCEDURE — 90686 IIV4 VACC NO PRSV 0.5 ML IM: CPT | Performed by: FAMILY MEDICINE

## 2021-09-27 PROCEDURE — G0008 ADMIN INFLUENZA VIRUS VAC: HCPCS | Performed by: FAMILY MEDICINE

## 2021-09-27 PROCEDURE — 82044 UR ALBUMIN SEMIQUANTITATIVE: CPT | Performed by: FAMILY MEDICINE

## 2021-09-27 PROCEDURE — 99214 OFFICE O/P EST MOD 30 MIN: CPT | Performed by: FAMILY MEDICINE

## 2021-09-27 PROCEDURE — 83036 HEMOGLOBIN GLYCOSYLATED A1C: CPT | Performed by: FAMILY MEDICINE

## 2021-09-28 LAB
ALBUMIN SERPL-MCNC: 4.7 G/DL (ref 3.5–5.2)
ALBUMIN/GLOB SERPL: 2.1 G/DL
ALP SERPL-CCNC: 71 U/L (ref 39–117)
ALT SERPL-CCNC: 15 U/L (ref 1–33)
AST SERPL-CCNC: 16 U/L (ref 1–32)
BASOPHILS # BLD AUTO: 0.01 10*3/MM3 (ref 0–0.2)
BASOPHILS NFR BLD AUTO: 0.1 % (ref 0–1.5)
BILIRUB SERPL-MCNC: 0.3 MG/DL (ref 0–1.2)
BUN SERPL-MCNC: 10 MG/DL (ref 6–20)
BUN/CREAT SERPL: 17.5 (ref 7–25)
CALCIUM SERPL-MCNC: 9.9 MG/DL (ref 8.6–10.5)
CHLORIDE SERPL-SCNC: 101 MMOL/L (ref 98–107)
CHOLEST SERPL-MCNC: 199 MG/DL (ref 0–200)
CO2 SERPL-SCNC: 26.8 MMOL/L (ref 22–29)
CREAT SERPL-MCNC: 0.57 MG/DL (ref 0.57–1)
EOSINOPHIL # BLD AUTO: 0.03 10*3/MM3 (ref 0–0.4)
EOSINOPHIL NFR BLD AUTO: 0.4 % (ref 0.3–6.2)
ERYTHROCYTE [DISTWIDTH] IN BLOOD BY AUTOMATED COUNT: 12.4 % (ref 12.3–15.4)
GLOBULIN SER CALC-MCNC: 2.2 GM/DL
GLUCOSE SERPL-MCNC: 92 MG/DL (ref 65–99)
HCT VFR BLD AUTO: 42 % (ref 34–46.6)
HDLC SERPL-MCNC: 46 MG/DL (ref 40–60)
HGB BLD-MCNC: 14.6 G/DL (ref 12–15.9)
IMM GRANULOCYTES # BLD AUTO: 0.01 10*3/MM3 (ref 0–0.05)
IMM GRANULOCYTES NFR BLD AUTO: 0.1 % (ref 0–0.5)
LDLC SERPL CALC-MCNC: 114 MG/DL (ref 0–100)
LYMPHOCYTES # BLD AUTO: 3.47 10*3/MM3 (ref 0.7–3.1)
LYMPHOCYTES NFR BLD AUTO: 49.4 % (ref 19.6–45.3)
MCH RBC QN AUTO: 33.8 PG (ref 26.6–33)
MCHC RBC AUTO-ENTMCNC: 34.8 G/DL (ref 31.5–35.7)
MCV RBC AUTO: 97.2 FL (ref 79–97)
MONOCYTES # BLD AUTO: 0.36 10*3/MM3 (ref 0.1–0.9)
MONOCYTES NFR BLD AUTO: 5.1 % (ref 5–12)
NEUTROPHILS # BLD AUTO: 3.15 10*3/MM3 (ref 1.7–7)
NEUTROPHILS NFR BLD AUTO: 44.9 % (ref 42.7–76)
NRBC BLD AUTO-RTO: 0 /100 WBC (ref 0–0.2)
PLATELET # BLD AUTO: 231 10*3/MM3 (ref 140–450)
POTASSIUM SERPL-SCNC: 4.5 MMOL/L (ref 3.5–5.2)
PROT SERPL-MCNC: 6.9 G/DL (ref 6–8.5)
RBC # BLD AUTO: 4.32 10*6/MM3 (ref 3.77–5.28)
SODIUM SERPL-SCNC: 141 MMOL/L (ref 136–145)
TRIGL SERPL-MCNC: 224 MG/DL (ref 0–150)
TSH SERPL DL<=0.005 MIU/L-ACNC: 0.87 UIU/ML (ref 0.27–4.2)
VLDLC SERPL CALC-MCNC: 39 MG/DL (ref 5–40)
WBC # BLD AUTO: 7.03 10*3/MM3 (ref 3.4–10.8)

## 2021-10-28 DIAGNOSIS — E78.2 MIXED HYPERLIPIDEMIA: ICD-10-CM

## 2021-10-28 DIAGNOSIS — E11.9 TYPE 2 DIABETES MELLITUS WITHOUT COMPLICATION, WITHOUT LONG-TERM CURRENT USE OF INSULIN (HCC): ICD-10-CM

## 2021-10-28 RX ORDER — METFORMIN HYDROCHLORIDE 500 MG/1
TABLET, EXTENDED RELEASE ORAL
Qty: 360 TABLET | Refills: 0 | Status: SHIPPED | OUTPATIENT
Start: 2021-10-28 | End: 2022-02-18

## 2021-10-28 RX ORDER — ATORVASTATIN CALCIUM 40 MG/1
TABLET, FILM COATED ORAL
Qty: 90 TABLET | Refills: 1 | Status: SHIPPED | OUTPATIENT
Start: 2021-10-28 | End: 2022-02-18

## 2022-02-16 ENCOUNTER — OFFICE VISIT (OUTPATIENT)
Dept: FAMILY MEDICINE CLINIC | Facility: CLINIC | Age: 57
End: 2022-02-16

## 2022-02-16 VITALS
OXYGEN SATURATION: 97 % | WEIGHT: 127.8 LBS | DIASTOLIC BLOOD PRESSURE: 68 MMHG | BODY MASS INDEX: 25.09 KG/M2 | HEART RATE: 87 BPM | TEMPERATURE: 96.6 F | SYSTOLIC BLOOD PRESSURE: 126 MMHG | HEIGHT: 60 IN

## 2022-02-16 DIAGNOSIS — M12.812 ROTATOR CUFF ARTHROPATHY OF LEFT SHOULDER: Primary | ICD-10-CM

## 2022-02-16 PROCEDURE — 99213 OFFICE O/P EST LOW 20 MIN: CPT | Performed by: FAMILY MEDICINE

## 2022-02-16 RX ORDER — MELOXICAM 15 MG/1
15 TABLET ORAL DAILY
Qty: 30 TABLET | Refills: 1 | Status: SHIPPED | OUTPATIENT
Start: 2022-02-16

## 2022-02-16 NOTE — PROGRESS NOTES
"Chief Complaint  Shoulder Pain (left picked up milk and made a popping noise unable to raise shoulder)    Subjective          Fang Kunz presents to [facility]  Patient is here today with a new problem.  She started having left shoulder pain about 2 weeks ago after lifting a gallon of milk out of the refrigerator.  She heard a pop and felt immediate pain in her left shoulder.  The pain has seemed to get worse over the last few days.  Patient has increased pain with abduction of the left upper extremity.  The patient is left-hand dominant but truly ambidextrous.  She had some tingling sensation in the left upper extremity during movements yesterday that lasted a few minutes.  However, she denies any continued numbness, tingling, or weakness.      Objective   Vital Signs:   /68   Pulse 87   Temp 96.6 °F (35.9 °C)   Ht 152.4 cm (60\")   Wt 58 kg (127 lb 12.8 oz)   SpO2 97%   BMI 24.96 kg/m²     Physical Exam  Vitals and nursing note reviewed.   Constitutional:       Appearance: Normal appearance. She is well-developed and normal weight.   HENT:      Head: Normocephalic and atraumatic.      Right Ear: External ear normal.      Left Ear: External ear normal.      Nose: Nose normal.   Eyes:      General: No scleral icterus.     Conjunctiva/sclera: Conjunctivae normal.   Cardiovascular:      Rate and Rhythm: Normal rate and regular rhythm.      Heart sounds: Normal heart sounds.   Pulmonary:      Effort: Pulmonary effort is normal.      Breath sounds: Normal breath sounds.   Musculoskeletal:         General: Tenderness (Left rotator cuff tenderness) present. No swelling or deformity.      Cervical back: Normal range of motion and neck supple.      Right lower leg: No edema.      Left lower leg: No edema.      Comments: Positive empty can sign on the left. Decreased range of motion to 45 degrees of abduction of left upper extremity. Only able to obtain 90 degrees of abduction " with passive range of motion.   Lymphadenopathy:      Cervical: No cervical adenopathy.   Skin:     General: Skin is warm and dry.      Findings: No rash.   Neurological:      Mental Status: She is alert and oriented to person, place, and time.   Psychiatric:         Mood and Affect: Mood normal.         Behavior: Behavior normal.         Thought Content: Thought content normal.         Judgment: Judgment normal.        Result Review :                 Assessment and Plan    Diagnoses and all orders for this visit:    1. Rotator cuff arthropathy of left shoulder (Primary)  -     meloxicam (Mobic) 15 MG tablet; Take 1 tablet by mouth Daily.  Dispense: 30 tablet; Refill: 1  -     Ambulatory Referral to Physical Therapy Evaluate and treat    Patient is here today for new problem of rotator cuff arthropathy. She is at high risk for a frozen shoulder since she is diabetic. I will get her to physical therapy as soon as possible. Patient was given a prescription for meloxicam and advised against using over-the-counter anti-inflammatories. However she may continue her pain medications as directed. Advised ice only and no heat. Patient given handouts of range of motion exercises for her to begin today while waiting to get into physical therapy. Patient was advised to follow-up if symptoms are worsening.      Follow Up   No follow-ups on file.  Patient was given instructions and counseling regarding her condition or for health maintenance advice. Please see specific information pulled into the AVS if appropriate.        kun leg pain

## 2022-02-18 ENCOUNTER — TELEPHONE (OUTPATIENT)
Dept: FAMILY MEDICINE CLINIC | Facility: CLINIC | Age: 57
End: 2022-02-18

## 2022-02-18 DIAGNOSIS — E11.9 TYPE 2 DIABETES MELLITUS WITHOUT COMPLICATION, WITHOUT LONG-TERM CURRENT USE OF INSULIN: ICD-10-CM

## 2022-02-18 DIAGNOSIS — E78.2 MIXED HYPERLIPIDEMIA: ICD-10-CM

## 2022-02-18 RX ORDER — METFORMIN HYDROCHLORIDE 500 MG/1
TABLET, EXTENDED RELEASE ORAL
Qty: 360 TABLET | Refills: 1 | Status: SHIPPED | OUTPATIENT
Start: 2022-02-18 | End: 2022-10-20

## 2022-02-18 RX ORDER — ATORVASTATIN CALCIUM 40 MG/1
TABLET, FILM COATED ORAL
Qty: 90 TABLET | Refills: 1 | Status: SHIPPED | OUTPATIENT
Start: 2022-02-18 | End: 2022-10-21 | Stop reason: SDUPTHER

## 2022-02-18 NOTE — TELEPHONE ENCOUNTER
[facility] called and is stating that the patient informed them that she is no longer taking her metformin or lipitor until her next appt.  Spoke to patient and she is stating that you told her that since she lost a lot of weight to wait her next appt and you would check her labs and determine what she needs to be on    Is this correct

## 2022-05-06 ENCOUNTER — TELEPHONE (OUTPATIENT)
Dept: FAMILY MEDICINE CLINIC | Facility: CLINIC | Age: 57
End: 2022-05-06

## 2022-05-06 NOTE — TELEPHONE ENCOUNTER
I agree. I think she should be able to do jury duty.  If she feels she cannot, then maybe she should discuss that with pain mgmt.

## 2022-05-06 NOTE — TELEPHONE ENCOUNTER
Caller: Kunz Fang T    Relationship: Self    Best call back number: [phone]    What form or medical record are you requesting: JURY DUTY EXEMPTION    Who is requesting this form or medical record from you: COURTS    How would you like to receive the form or medical records (pick-up, mail, fax):      Timeframe paperwork needed: ASAP    Additional notes: PATIENT GOT A LETTER FOR JURY DUTY IN THE MAIL TODAY AND IS NEEDING US TO WRITE A LETTER STATING SHE IS NOT ABLE TO DUE THIS DUE TO HER DISABILITY, IT HAS TO BE RETURNED TO THE COURTS IN 5 DAYS.    PLEASE ADVISE

## 2022-05-06 NOTE — TELEPHONE ENCOUNTER
Pt is stating that she can not stand or sit very long and that her back is messed up.  She is stating that she sees pain management for that.  Informed her that she may want to contact them for the letter because I informed her that I wasn't sure you could write the letter since you do not treat her back

## 2022-09-13 ENCOUNTER — TELEPHONE (OUTPATIENT)
Dept: FAMILY MEDICINE CLINIC | Facility: CLINIC | Age: 57
End: 2022-09-13

## 2022-09-13 NOTE — TELEPHONE ENCOUNTER
HUB TO READ  PATIENT HAS TO BE SEEN TO HAVE DISABILITY FORMS FILLED OUT. I SCHEDULED HER FOR 09/15/22 @ 9:45 WITH .

## 2022-10-20 ENCOUNTER — OFFICE VISIT (OUTPATIENT)
Dept: FAMILY MEDICINE CLINIC | Facility: CLINIC | Age: 57
End: 2022-10-20

## 2022-10-20 VITALS
DIASTOLIC BLOOD PRESSURE: 78 MMHG | SYSTOLIC BLOOD PRESSURE: 126 MMHG | TEMPERATURE: 97.7 F | OXYGEN SATURATION: 100 % | HEART RATE: 91 BPM | BODY MASS INDEX: 25.8 KG/M2 | HEIGHT: 60 IN | WEIGHT: 131.4 LBS

## 2022-10-20 DIAGNOSIS — E78.2 MIXED HYPERLIPIDEMIA: ICD-10-CM

## 2022-10-20 DIAGNOSIS — M12.812 ROTATOR CUFF ARTHROPATHY OF LEFT SHOULDER: ICD-10-CM

## 2022-10-20 DIAGNOSIS — M51.36 DDD (DEGENERATIVE DISC DISEASE), LUMBAR: ICD-10-CM

## 2022-10-20 DIAGNOSIS — M79.7 FIBROMYALGIA: ICD-10-CM

## 2022-10-20 DIAGNOSIS — E11.9 TYPE 2 DIABETES MELLITUS WITHOUT COMPLICATION, WITHOUT LONG-TERM CURRENT USE OF INSULIN: Primary | ICD-10-CM

## 2022-10-20 DIAGNOSIS — Z23 NEEDS FLU SHOT: ICD-10-CM

## 2022-10-20 DIAGNOSIS — Z79.899 ENCOUNTER FOR LONG-TERM (CURRENT) USE OF MEDICATIONS: ICD-10-CM

## 2022-10-20 LAB
ALBUMIN SERPL-MCNC: 4.5 G/DL (ref 3.5–5.2)
ALBUMIN/GLOB SERPL: 1.8 G/DL
ALP SERPL-CCNC: 91 U/L (ref 39–117)
ALT SERPL-CCNC: 15 U/L (ref 1–33)
AST SERPL-CCNC: 18 U/L (ref 1–32)
BILIRUB SERPL-MCNC: 0.3 MG/DL (ref 0–1.2)
BUN SERPL-MCNC: 7 MG/DL (ref 6–20)
BUN/CREAT SERPL: 11.3 (ref 7–25)
CALCIUM SERPL-MCNC: 9.8 MG/DL (ref 8.6–10.5)
CHLORIDE SERPL-SCNC: 102 MMOL/L (ref 98–107)
CHOLEST SERPL-MCNC: 227 MG/DL (ref 0–200)
CO2 SERPL-SCNC: 27 MMOL/L (ref 22–29)
CREAT SERPL-MCNC: 0.62 MG/DL (ref 0.57–1)
EGFRCR SERPLBLD CKD-EPI 2021: 103.4 ML/MIN/1.73
GLOBULIN SER CALC-MCNC: 2.5 GM/DL
GLUCOSE SERPL-MCNC: 136 MG/DL (ref 65–99)
HDLC SERPL-MCNC: 38 MG/DL (ref 40–60)
LDLC SERPL CALC-MCNC: 136 MG/DL (ref 0–100)
POTASSIUM SERPL-SCNC: 4.5 MMOL/L (ref 3.5–5.2)
PROT SERPL-MCNC: 7 G/DL (ref 6–8.5)
SODIUM SERPL-SCNC: 140 MMOL/L (ref 136–145)
TRIGL SERPL-MCNC: 291 MG/DL (ref 0–150)
VLDLC SERPL CALC-MCNC: 53 MG/DL (ref 5–40)

## 2022-10-20 PROCEDURE — 90686 IIV4 VACC NO PRSV 0.5 ML IM: CPT | Performed by: FAMILY MEDICINE

## 2022-10-20 PROCEDURE — G0008 ADMIN INFLUENZA VIRUS VAC: HCPCS | Performed by: FAMILY MEDICINE

## 2022-10-20 PROCEDURE — 99214 OFFICE O/P EST MOD 30 MIN: CPT | Performed by: FAMILY MEDICINE

## 2022-10-20 RX ORDER — OXYCODONE AND ACETAMINOPHEN 7.5; 325 MG/1; MG/1
1 TABLET ORAL EVERY 6 HOURS PRN
COMMUNITY

## 2022-10-20 NOTE — PROGRESS NOTES
"Chief Complaint  fill out LTD paperwork  (Back legs and fibro)    Subjective        Fang Kunz presents to [facility]  History of Present Illness  Patient is here today to follow-up on her chronic medical conditions:    Hyperlipidemia.  The patient is compliant with taking atorvastatin 40 mg daily and denies any side effects.      Diabetes.  The patient is no longer taking metformin.  Her blood sugars have been well controlled with diet and exercise since 2021.  She denies any side effects.  Patient states that her home blood sugar readings have been good lately    Fibromyalgia.  Patient is seen by pain management and is maintained on gabapentin and Percocet for fibromyalgia as well as a left rotator cuff problem and degenerative disc disease of the lumbar spine.  Chronic pain causes her decreased mobility and the inability to sit for long periods of time or to perform other necessary movements for gainful employment.  For these reasons the patient has been on long-term disability since 2010.  She is here today for reevaluation and to complete the paperwork      Objective   Vital Signs:  /78   Pulse 91   Temp 97.7 °F (36.5 °C)   Ht 152.4 cm (60\")   Wt 59.6 kg (131 lb 6.4 oz)   SpO2 100%   BMI 25.66 kg/m²   Estimated body mass index is 25.66 kg/m² as calculated from the following:    Height as of this encounter: 152.4 cm (60\").    Weight as of this encounter: 59.6 kg (131 lb 6.4 oz).          Physical Exam  Vitals and nursing note reviewed.   Constitutional:       Appearance: She is well-developed.   HENT:      Head: Normocephalic and atraumatic.      Right Ear: External ear normal.      Left Ear: External ear normal.      Nose: Nose normal.   Eyes:      General: No scleral icterus.     Conjunctiva/sclera: Conjunctivae normal.   Cardiovascular:      Rate and Rhythm: Normal rate and regular rhythm.      Heart sounds: Normal heart sounds.   Pulmonary:      Effort: " Pulmonary effort is normal.      Breath sounds: Normal breath sounds.   Musculoskeletal:      Cervical back: Normal range of motion and neck supple.   Lymphadenopathy:      Cervical: No cervical adenopathy.   Skin:     General: Skin is warm and dry.      Findings: No rash.   Neurological:      Mental Status: She is alert and oriented to person, place, and time.   Psychiatric:         Mood and Affect: Mood normal.         Behavior: Behavior normal.         Thought Content: Thought content normal.         Judgment: Judgment normal.        Result Review :  The following data was reviewed by: Diana Bauer DO on 10/20/2022:  Common labs    Common Labs 7/26/22   Hemoglobin A1C 6.7 (A)   (A) Abnormal value       Comments are available for some flowsheets but are not being displayed.                        HEMOGLOBIN A1C (07/26/2022 14:59)    Assessment and Plan   Diagnoses and all orders for this visit:    1. Type 2 diabetes mellitus without complication, without long-term current use of insulin (HCC) (Primary)  -     Comprehensive Metabolic Panel    2. Mixed hyperlipidemia  -     Lipid Panel    3. Fibromyalgia    4. Rotator cuff arthropathy of left shoulder    5. DDD (degenerative disc disease), lumbar    6. Encounter for long-term (current) use of medications  -     Comprehensive Metabolic Panel  -     Lipid Panel    7. Needs flu shot  -     FluLaval/Fluzone >6 mos ([id number])    Patient is seen for chronic stable type 2 diabetes, hypertension, fibromyalgia.  Surveillance labs were obtained today and any medication changes will be made based on lab results and will be called to the patient later this week.         Follow Up   Return in about 5 months (around 3/8/2023) for Medicare Wellness, Diabetes.  Patient was given instructions and counseling regarding her condition or for health maintenance advice. Please see specific information pulled into the AVS if appropriate.

## 2022-10-21 DIAGNOSIS — E78.2 MIXED HYPERLIPIDEMIA: ICD-10-CM

## 2022-10-21 RX ORDER — ATORVASTATIN CALCIUM 80 MG/1
80 TABLET, FILM COATED ORAL
Qty: 90 TABLET | Refills: 1 | Status: SHIPPED | OUTPATIENT
Start: 2022-10-21

## 2023-08-14 ENCOUNTER — OFFICE VISIT (OUTPATIENT)
Dept: FAMILY MEDICINE CLINIC | Facility: CLINIC | Age: 58
End: 2023-08-14
Payer: MEDICARE

## 2023-08-14 VITALS
DIASTOLIC BLOOD PRESSURE: 70 MMHG | HEART RATE: 85 BPM | OXYGEN SATURATION: 97 % | HEIGHT: 60 IN | WEIGHT: 134.8 LBS | SYSTOLIC BLOOD PRESSURE: 114 MMHG | BODY MASS INDEX: 26.46 KG/M2

## 2023-08-14 DIAGNOSIS — M51.36 DDD (DEGENERATIVE DISC DISEASE), LUMBAR: ICD-10-CM

## 2023-08-14 DIAGNOSIS — Z79.899 ENCOUNTER FOR LONG-TERM (CURRENT) USE OF MEDICATIONS: ICD-10-CM

## 2023-08-14 DIAGNOSIS — K21.9 GASTROESOPHAGEAL REFLUX DISEASE WITHOUT ESOPHAGITIS: ICD-10-CM

## 2023-08-14 DIAGNOSIS — M79.7 FIBROMYALGIA: Primary | ICD-10-CM

## 2023-08-14 DIAGNOSIS — E78.2 MIXED HYPERLIPIDEMIA: ICD-10-CM

## 2023-08-14 DIAGNOSIS — E11.9 TYPE 2 DIABETES MELLITUS WITHOUT COMPLICATION, WITHOUT LONG-TERM CURRENT USE OF INSULIN: ICD-10-CM

## 2023-08-14 PROCEDURE — 1160F RVW MEDS BY RX/DR IN RCRD: CPT | Performed by: FAMILY MEDICINE

## 2023-08-14 PROCEDURE — 99214 OFFICE O/P EST MOD 30 MIN: CPT | Performed by: FAMILY MEDICINE

## 2023-08-14 PROCEDURE — 1159F MED LIST DOCD IN RCRD: CPT | Performed by: FAMILY MEDICINE

## 2023-08-14 RX ORDER — PANTOPRAZOLE SODIUM 20 MG/1
20 TABLET, DELAYED RELEASE ORAL DAILY
Qty: 90 TABLET | Refills: 3 | Status: SHIPPED | OUTPATIENT
Start: 2023-08-14

## 2023-08-14 NOTE — PROGRESS NOTES
"Chief Complaint  LTD PAPERWORK     Subjective        Fang Kunz presents to [facility]  History of Present Illness  Patient is here today to follow-up on her chronic medical conditions:    Hyperlipidemia.  The patient is not compliant with taking atorvastatin 80 mg daily.  She misses it approximately 50% of the time.  Denies any side effects.      Diabetes.  The patient is no longer taking metformin.  Her blood sugars have been well controlled with diet and exercise since 2021.  She denies any side effects.  Patient states that her home blood sugar readings have been good lately    Fibromyalgia.  Patient is seen by pain management and is maintained on gabapentin and Percocet for fibromyalgia as well as a left rotator cuff problem and degenerative disc disease of the lumbar spine.  Chronic pain causes her decreased mobility and the inability to sit for long periods of time or to perform other necessary movements for gainful employment.  For these reasons the patient has been on long-term disability since 2010.  She is here today for reevaluation and to complete the paperwork    GERD.  Patient was taking Prilosec OTC.  She ran out and had increased symptoms so she tried her son's pantoprazole 40 mg and noticed it worked better.  She would like a prescription for that.    Objective   Vital Signs:  /70   Pulse 85   Ht 152.4 cm (60\")   Wt 61.1 kg (134 lb 12.8 oz)   SpO2 97%   BMI 26.33 kg/mý   Estimated body mass index is 26.33 kg/mý as calculated from the following:    Height as of this encounter: 152.4 cm (60\").    Weight as of this encounter: 61.1 kg (134 lb 12.8 oz).             Physical Exam  Vitals and nursing note reviewed.   Constitutional:       Appearance: Normal appearance. She is well-developed and normal weight.   HENT:      Head: Normocephalic and atraumatic.      Right Ear: External ear normal.      Left Ear: External ear normal.      Nose: Nose normal. "   Eyes:      General: No scleral icterus.     Conjunctiva/sclera: Conjunctivae normal.   Cardiovascular:      Rate and Rhythm: Normal rate and regular rhythm.      Heart sounds: Normal heart sounds.   Pulmonary:      Effort: Pulmonary effort is normal.      Breath sounds: Normal breath sounds.   Musculoskeletal:      Cervical back: Normal range of motion and neck supple.      Right lower leg: No edema.      Left lower leg: No edema.   Lymphadenopathy:      Cervical: No cervical adenopathy.   Skin:     General: Skin is warm and dry.      Findings: No rash.   Neurological:      Mental Status: She is alert and oriented to person, place, and time.   Psychiatric:         Mood and Affect: Mood normal.         Behavior: Behavior normal.         Thought Content: Thought content normal.         Judgment: Judgment normal.      Result Review :  The following data was reviewed by: Diana Bauer DO on 08/14/2023:  Common labs          10/20/2022    10:51   Common Labs   Glucose 136    BUN 7    Creatinine 0.62    Sodium 140    Potassium 4.5    Chloride 102    Calcium 9.8    Total Protein 7.0    Albumin 4.50    Total Bilirubin 0.3    Alkaline Phosphatase 91    AST (SGOT) 18    ALT (SGPT) 15    Total Cholesterol 227    Triglycerides 291    HDL Cholesterol 38    LDL Cholesterol  136                     Assessment and Plan   Diagnoses and all orders for this visit:    1. Fibromyalgia (Primary)    2. Mixed hyperlipidemia  -     Lipid Panel    3. Type 2 diabetes mellitus without complication, without long-term current use of insulin  -     Hemoglobin A1c    4. DDD (degenerative disc disease), lumbar    5. Encounter for long-term (current) use of medications  -     CBC & Differential  -     Comprehensive Metabolic Panel  -     Lipid Panel  -     Hemoglobin A1c    6. Gastroesophageal reflux disease without esophagitis  -     pantoprazole (PROTONIX) 20 MG EC tablet; Take 1 tablet by mouth Daily.  Dispense: 90 tablet; Refill:  3    Patient is here to follow-up on chronic stable fibromyalgia, hyperlipidemia, type 2 diabetes, and GERD. Surveillance labs were obtained today and any medication changes will be made based on lab results and will be called to the patient later this week.     Since the patient had improved symptom relief on pantoprazole 40 we will do a trial of pantoprazole 20 mg daily.  If she has any troubles contact me.  I encouraged to her to take it more consistently and we will consider a stepdown to Pepcid after her symptoms are controlled.    .       Follow Up   Return for Medicare Wellness.  Patient was given instructions and counseling regarding her condition or for health maintenance advice. Please see specific information pulled into the AVS if appropriate.

## 2023-08-15 LAB
ALBUMIN SERPL-MCNC: 4.2 G/DL (ref 3.8–4.9)
ALBUMIN/GLOB SERPL: 1.7 {RATIO} (ref 1.2–2.2)
ALP SERPL-CCNC: 104 IU/L (ref 44–121)
ALT SERPL-CCNC: 14 IU/L (ref 0–32)
AST SERPL-CCNC: 14 IU/L (ref 0–40)
BASOPHILS # BLD AUTO: 0 X10E3/UL (ref 0–0.2)
BASOPHILS NFR BLD AUTO: 0 %
BILIRUB SERPL-MCNC: <0.2 MG/DL (ref 0–1.2)
BUN SERPL-MCNC: 9 MG/DL (ref 6–24)
BUN/CREAT SERPL: 13 (ref 9–23)
CALCIUM SERPL-MCNC: 9.5 MG/DL (ref 8.7–10.2)
CHLORIDE SERPL-SCNC: 98 MMOL/L (ref 96–106)
CHOLEST SERPL-MCNC: 222 MG/DL (ref 100–199)
CO2 SERPL-SCNC: 24 MMOL/L (ref 20–29)
CREAT SERPL-MCNC: 0.7 MG/DL (ref 0.57–1)
EGFRCR SERPLBLD CKD-EPI 2021: 100 ML/MIN/1.73
EOSINOPHIL # BLD AUTO: 0 X10E3/UL (ref 0–0.4)
EOSINOPHIL NFR BLD AUTO: 0 %
ERYTHROCYTE [DISTWIDTH] IN BLOOD BY AUTOMATED COUNT: 12.5 % (ref 11.7–15.4)
GLOBULIN SER CALC-MCNC: 2.5 G/DL (ref 1.5–4.5)
GLUCOSE SERPL-MCNC: 268 MG/DL (ref 70–99)
HBA1C MFR BLD: 9.4 % (ref 4.8–5.6)
HCT VFR BLD AUTO: 44.6 % (ref 34–46.6)
HDLC SERPL-MCNC: 33 MG/DL
HGB BLD-MCNC: 15.4 G/DL (ref 11.1–15.9)
IMM GRANULOCYTES # BLD AUTO: 0 X10E3/UL (ref 0–0.1)
IMM GRANULOCYTES NFR BLD AUTO: 0 %
LDLC SERPL CALC-MCNC: 112 MG/DL (ref 0–99)
LYMPHOCYTES # BLD AUTO: 3 X10E3/UL (ref 0.7–3.1)
LYMPHOCYTES NFR BLD AUTO: 35 %
MCH RBC QN AUTO: 34.4 PG (ref 26.6–33)
MCHC RBC AUTO-ENTMCNC: 34.5 G/DL (ref 31.5–35.7)
MCV RBC AUTO: 100 FL (ref 79–97)
MONOCYTES # BLD AUTO: 0.3 X10E3/UL (ref 0.1–0.9)
MONOCYTES NFR BLD AUTO: 4 %
NEUTROPHILS # BLD AUTO: 5.2 X10E3/UL (ref 1.4–7)
NEUTROPHILS NFR BLD AUTO: 61 %
PLATELET # BLD AUTO: 167 X10E3/UL (ref 150–450)
POTASSIUM SERPL-SCNC: 4.6 MMOL/L (ref 3.5–5.2)
PROT SERPL-MCNC: 6.7 G/DL (ref 6–8.5)
RBC # BLD AUTO: 4.48 X10E6/UL (ref 3.77–5.28)
SODIUM SERPL-SCNC: 135 MMOL/L (ref 134–144)
TRIGL SERPL-MCNC: 442 MG/DL (ref 0–149)
VLDLC SERPL CALC-MCNC: 77 MG/DL (ref 5–40)
WBC # BLD AUTO: 8.6 X10E3/UL (ref 3.4–10.8)

## 2023-08-17 ENCOUNTER — OFFICE VISIT (OUTPATIENT)
Dept: FAMILY MEDICINE CLINIC | Facility: CLINIC | Age: 58
End: 2023-08-17
Payer: MEDICARE

## 2023-08-17 VITALS
SYSTOLIC BLOOD PRESSURE: 130 MMHG | BODY MASS INDEX: 26.03 KG/M2 | HEART RATE: 87 BPM | OXYGEN SATURATION: 98 % | HEIGHT: 60 IN | DIASTOLIC BLOOD PRESSURE: 76 MMHG | WEIGHT: 132.6 LBS

## 2023-08-17 DIAGNOSIS — E11.65 TYPE 2 DIABETES MELLITUS WITH HYPERGLYCEMIA, WITHOUT LONG-TERM CURRENT USE OF INSULIN: ICD-10-CM

## 2023-08-17 DIAGNOSIS — Z12.11 SCREENING FOR MALIGNANT NEOPLASM OF COLON: ICD-10-CM

## 2023-08-17 DIAGNOSIS — Z12.31 SCREENING MAMMOGRAM FOR BREAST CANCER: ICD-10-CM

## 2023-08-17 DIAGNOSIS — Z00.00 MEDICARE ANNUAL WELLNESS VISIT, SUBSEQUENT: Primary | ICD-10-CM

## 2023-08-17 DIAGNOSIS — R11.0 NAUSEA: ICD-10-CM

## 2023-08-17 RX ORDER — ONDANSETRON 4 MG/1
4 TABLET, ORALLY DISINTEGRATING ORAL EVERY 8 HOURS PRN
Qty: 60 TABLET | Refills: 1 | Status: SHIPPED | OUTPATIENT
Start: 2023-08-17

## 2023-08-17 RX ORDER — METFORMIN HYDROCHLORIDE 500 MG/1
500 TABLET, EXTENDED RELEASE ORAL 2 TIMES DAILY WITH MEALS
Qty: 60 TABLET | Refills: 2 | Status: SHIPPED | OUTPATIENT
Start: 2023-08-17

## 2023-08-17 NOTE — PROGRESS NOTES
The ABCs of the Annual Wellness Visit  Subsequent Medicare Wellness Visit    Subjective    Fang Kunz is a 57 y.o. female who presents for a Subsequent Medicare Wellness Visit.    The following portions of the patient's history were reviewed and   updated as appropriate: allergies, current medications, past family history, past medical history, past social history, past surgical history, and problem list.    Compared to one year ago, the patient feels her physical   health is the same.    Compared to one year ago, the patient feels her mental   health is the same.    Recent Hospitalizations:  She was not admitted to the hospital during the last year.       Current Medical Providers:  Patient Care Team:  Diana Bauer,  as PCP - General (Family Medicine)    Outpatient Medications Prior to Visit   Medication Sig Dispense Refill    albuterol sulfate  (90 Base) MCG/ACT inhaler Inhale 2 puffs Every 4 (Four) Hours As Needed.      atorvastatin (LIPITOR) 80 MG tablet Take 1 tablet by mouth every night at bedtime. 90 tablet 1    clobetasol (TEMOVATE) 0.05 % cream   5    cyclobenzaprine (FLEXERIL) 10 MG tablet       gabapentin (NEURONTIN) 600 MG tablet TAKE ONE TABLET BY MOUTH EVERY 6 HOURS FOR 30 DAYS      meloxicam (Mobic) 15 MG tablet Take 1 tablet by mouth Daily. 30 tablet 1    oxyCODONE-acetaminophen (PERCOCET) 7.5-325 MG per tablet Take 1 tablet by mouth Every 6 (Six) Hours As Needed.      pantoprazole (PROTONIX) 20 MG EC tablet Take 1 tablet by mouth Daily. 90 tablet 3     No facility-administered medications prior to visit.       Opioid medication/s are on active medication list.  and I have evaluated her active treatment plan and pain score trends (see table).  There were no vitals filed for this visit.  I have reviewed the chart for potential of high risk medication and harmful drug interactions in the elderly.          Aspirin is not on active medication list.  Aspirin use is not indicated based  "on review of current medical condition/s. Risk of harm outweighs potential benefits.  .    Patient Active Problem List   Diagnosis    Hyperlipidemia    GERD without esophagitis    Type 2 diabetes mellitus without complication, without long-term current use of insulin    Encounter for long-term (current) use of medications     Advance Care Planning   Advance Care Planning     Advance Directive is not on file.  ACP discussion was held with the patient during this visit. Patient has an advance directive (not in EMR), copy requested.     Objective    Vitals:    23 1315   BP: 130/76   Pulse: 87   SpO2: 98%   Weight: 60.1 kg (132 lb 9.6 oz)   Height: 152.4 cm (60\")     Estimated body mass index is 25.9 kg/mý as calculated from the following:    Height as of this encounter: 152.4 cm (60\").    Weight as of this encounter: 60.1 kg (132 lb 9.6 oz).    BMI is >= 25 and <30. (Overweight) The following options were offered after discussion;: exercise counseling/recommendations and nutrition counseling/recommendations      Does the patient have evidence of cognitive impairment? No    Lab Results   Component Value Date    CHLPL 222 (H) 2023    TRIG 442 (H) 2023    HDL 33 (L) 2023     (H) 2023    VLDL 77 (H) 2023    HGBA1C 9.4 (H) 2023        HEALTH RISK ASSESSMENT    Smoking Status:  Social History     Tobacco Use   Smoking Status Former   Smokeless Tobacco Never   Tobacco Comments    1/2 PPD x 15+ years     Alcohol Consumption:  Social History     Substance and Sexual Activity   Alcohol Use Yes    Comment: rare     Fall Risk Screen:    STEADI Fall Risk Assessment was completed, and patient is at LOW risk for falls.Assessment completed on:2023      Depression Screenin/17/2023     1:15 PM   PHQ-2/PHQ-9 Depression Screening   Little Interest or Pleasure in Doing Things 0-->not at all   Feeling Down, Depressed or Hopeless 0-->not at all   PHQ-9: Brief Depression Severity " Measure Score 0       Health Habits and Functional and Cognitive Screenin/17/2023     1:14 PM   Functional & Cognitive Status   Do you have difficulty preparing food and eating? No   Do you have difficulty bathing yourself, getting dressed or grooming yourself? No   Do you have difficulty using the toilet? No   Do you have difficulty moving around from place to place? Yes   Do you have trouble with steps or getting out of a bed or a chair? Yes   Current Diet Other   Dental Exam Not up to date   Eye Exam Up to date   Exercise (times per week) 0 times per week   Current Exercises Include No Regular Exercise   Do you need help using the phone?  No   Are you deaf or do you have serious difficulty hearing?  No   Do you need help to go to places out of walking distance? No   Do you need help shopping? No   Do you need help preparing meals?  No   Do you need help with housework?  Yes   Do you need help with laundry? Yes   Do you need help taking your medications? No   Do you need help managing money? No       Age-appropriate Screening Schedule:  Refer to the list below for future screening recommendations based on patient's age, sex and/or medical conditions. Orders for these recommended tests are listed in the plan section. The patient has been provided with a written plan.    Health Maintenance   Topic Date Due    Hepatitis B (1 of 3 - 3-dose series) Never done    COLORECTAL CANCER SCREENING  Never done    TDAP/TD VACCINES (1 - Tdap) Never done    ZOSTER VACCINE (1 of 2) Never done    DIABETIC EYE EXAM  2020    COVID-19 Vaccine (3 - Moderna series) 2021    Pneumococcal Vaccine 0-64 (2 - PCV) 2022    URINE MICROALBUMIN  2022    MAMMOGRAM  2023    INFLUENZA VACCINE  10/01/2023    HEMOGLOBIN A1C  2024    LIPID PANEL  2024    ANNUAL WELLNESS VISIT  2024    DIABETIC FOOT EXAM  2024    HEPATITIS C SCREENING  Completed    PAP SMEAR  Discontinued                  CMS  "Preventative Services Quick Reference  Risk Factors Identified During Encounter  Immunizations Discussed/Encouraged: Influenza, Prevnar 20 (Pneumococcal 20-valent conjugate), Shingrix, and COVID19  Dental Screening Recommended  Vision Screening Recommended  The above risks/problems have been discussed with the patient.  Pertinent information has been shared with the patient in the After Visit Summary.  An After Visit Summary and PPPS were made available to the patient.    Follow Up:   Next Medicare Wellness visit to be scheduled in 1 year.       Additional E&M Note during same encounter follows:  Patient has multiple medical problems which are significant and separately identifiable that require additional work above and beyond the Medicare Wellness Visit.      Chief Complaint  Medicare Wellness-subsequent    Subjective        HPI  Fang Kunz is also being seen today for follow-up on her chronic medical conditions:    Hyperlipidemia.  The patient is not compliant with taking atorvastatin 80 mg daily.  Recent labs revealed that the patient was not at goal but she also was taking the atorvastatin inconsistently.  She denies side effects.  She has been doing better since she was last seen.    Uncontrolled diabetes.  The patient was diet controlled since 2021 until her recent labs showed an A1c of 9.4.  She states that her diet has not been good lately.  She had been on metformin in the past.  She did have nausea, vomiting, and diarrhea with metformin in the past.  However she is willing to restart it.  She also is willing to work on her diet         Objective   Vital Signs:  /76   Pulse 87   Ht 152.4 cm (60\")   Wt 60.1 kg (132 lb 9.6 oz)   SpO2 98%   BMI 25.90 kg/mý     Physical Exam  Vitals and nursing note reviewed.   Constitutional:       Appearance: Normal appearance. She is well-developed and normal weight.   HENT:      Head: Normocephalic and atraumatic.      Right Ear: External ear normal.      " Left Ear: External ear normal.      Nose: Nose normal.   Eyes:      General: No scleral icterus.     Conjunctiva/sclera: Conjunctivae normal.   Cardiovascular:      Rate and Rhythm: Normal rate and regular rhythm.      Heart sounds: Normal heart sounds.   Pulmonary:      Effort: Pulmonary effort is normal.      Breath sounds: Normal breath sounds.   Musculoskeletal:      Cervical back: Normal range of motion and neck supple.      Right lower leg: No edema.      Left lower leg: No edema.   Lymphadenopathy:      Cervical: No cervical adenopathy.   Skin:     General: Skin is warm and dry.      Findings: No rash.   Neurological:      Mental Status: She is alert and oriented to person, place, and time.   Psychiatric:         Mood and Affect: Mood normal.         Behavior: Behavior normal.         Thought Content: Thought content normal.         Judgment: Judgment normal.        The following data was reviewed by: Diana Bauer DO on 08/17/2023:  Common labs          10/20/2022    10:51 8/14/2023    09:23   Common Labs   Glucose 136  268    BUN 7  9    Creatinine 0.62  0.70    Sodium 140  135    Potassium 4.5  4.6    Chloride 102  98    Calcium 9.8  9.5    Total Protein 7.0  6.7    Albumin 4.50  4.2    Total Bilirubin 0.3  <0.2    Alkaline Phosphatase 91  104    AST (SGOT) 18  14    ALT (SGPT) 15  14    WBC  8.6    Hemoglobin  15.4    Hematocrit  44.6    Platelets  167    Total Cholesterol 227  222    Triglycerides 291  442    HDL Cholesterol 38  33    LDL Cholesterol  136  112    Hemoglobin A1C  9.4                   Assessment and Plan   Diagnoses and all orders for this visit:    1. Medicare annual wellness visit, subsequent (Primary)    2. Type 2 diabetes mellitus with hyperglycemia, without long-term current use of insulin  -     metFORMIN ER (GLUCOPHAGE-XR) 500 MG 24 hr tablet; Take 1 tablet by mouth 2 (Two) Times a Day With Meals.  Dispense: 60 tablet; Refill: 2  -     POC Microalbumin    3. Nausea  -      ondansetron ODT (ZOFRAN-ODT) 4 MG disintegrating tablet; Place 1 tablet on the tongue Every 8 (Eight) Hours As Needed for Nausea or Vomiting.  Dispense: 60 tablet; Refill: 1    4. Screening for malignant neoplasm of colon  -     Cologuard - Stool, Per Rectum; Future    5. Screening mammogram for breast cancer  -     Mammo Screening Digital Tomosynthesis Bilateral With CAD; Future    RHM.  Orders as above.  Discussed vaccine recommendations.    Patient is also here to follow-up on uncontrolled type 2 diabetes.  We will restart metformin 500 mg daily and then titrate up as tolerated.  Patient will work harder on a diabetic diet.  Patient was given Zofran to offset any nausea she may have with starting metformin.  If she has any significant side effects she should follow-up.    Patient is also here to follow-up on uncontrolled hyperlipidemia.  Patient agrees to taking atorvastatin 80 mg every day.  We will recheck lipids at her next appointment.           Follow Up   Return in about 3 months (around 11/15/2023) for Diabetes, HTN.  Patient was given instructions and counseling regarding her condition or for health maintenance advice. Please see specific information pulled into the AVS if appropriate.

## 2023-09-01 ENCOUNTER — TELEPHONE (OUTPATIENT)
Dept: FAMILY MEDICINE CLINIC | Facility: CLINIC | Age: 58
End: 2023-09-01

## 2023-09-01 NOTE — TELEPHONE ENCOUNTER
Caller: Ze Fang T    Relationship: Self    Best call back number: [phone]    What form or medical record are you requesting: DISABILITY PAPERWORK    Who is requesting this form or medical record from you: JAYLYN    How would you like to receive the form or medical records (pick-up, mail, fax): FAX TO THE NUMBER LISTED ON THE PAPERWORK    Timeframe paperwork needed: ASAP    Additional notes: JAYLYN STATES THAT THEY DID NOT RECEIVE THIS COMPLETED PAPERWORK FROM THE OFFICE.

## 2023-09-28 ENCOUNTER — TELEPHONE (OUTPATIENT)
Dept: FAMILY MEDICINE CLINIC | Facility: CLINIC | Age: 58
End: 2023-09-28
Payer: MEDICARE

## 2023-11-17 ENCOUNTER — OFFICE VISIT (OUTPATIENT)
Dept: FAMILY MEDICINE CLINIC | Facility: CLINIC | Age: 58
End: 2023-11-17
Payer: MEDICARE

## 2023-11-17 VITALS
OXYGEN SATURATION: 100 % | HEART RATE: 60 BPM | WEIGHT: 126.8 LBS | BODY MASS INDEX: 24.9 KG/M2 | HEIGHT: 60 IN | SYSTOLIC BLOOD PRESSURE: 117 MMHG | DIASTOLIC BLOOD PRESSURE: 63 MMHG

## 2023-11-17 DIAGNOSIS — Z23 NEEDS FLU SHOT: ICD-10-CM

## 2023-11-17 DIAGNOSIS — Z79.899 ENCOUNTER FOR LONG-TERM (CURRENT) USE OF MEDICATIONS: ICD-10-CM

## 2023-11-17 DIAGNOSIS — E78.2 MIXED HYPERLIPIDEMIA: ICD-10-CM

## 2023-11-17 DIAGNOSIS — E11.9 TYPE 2 DIABETES MELLITUS WITHOUT COMPLICATION, WITHOUT LONG-TERM CURRENT USE OF INSULIN: Primary | ICD-10-CM

## 2023-11-17 LAB
EXPIRATION DATE: ABNORMAL
EXPIRATION DATE: ABNORMAL
HBA1C MFR BLD: 7 % (ref 4.5–5.7)
Lab: ABNORMAL
Lab: ABNORMAL
POC CREATININE URINE: 100
POC MICROALBUMIN URINE: 30

## 2023-11-17 RX ORDER — ATORVASTATIN CALCIUM 40 MG/1
40 TABLET, FILM COATED ORAL
Qty: 90 TABLET | Refills: 1 | Status: SHIPPED | OUTPATIENT
Start: 2023-11-17

## 2023-11-17 RX ORDER — METFORMIN HYDROCHLORIDE 500 MG/1
500 TABLET, EXTENDED RELEASE ORAL 2 TIMES DAILY WITH MEALS
Qty: 180 TABLET | Refills: 1 | Status: SHIPPED | OUTPATIENT
Start: 2023-11-17

## 2023-11-17 RX ORDER — ALBUTEROL SULFATE 90 UG/1
2 AEROSOL, METERED RESPIRATORY (INHALATION) EVERY 4 HOURS PRN
Qty: 6.7 G | Refills: 0 | Status: SHIPPED | OUTPATIENT
Start: 2023-11-17

## 2023-11-17 NOTE — PROGRESS NOTES
"Chief Complaint  Diabetes    Subjective        Fang Kunz presents to [facility]  History of Present Illness  Patient is here to follow-up on her chronic medical conditions:    Hyperlipidemia.  The patient is compliant with taking atorvastatin 40 mg daily.   She denies side effects.  She has been doing better since she was last seen.    Type 2 diabetes.  3 months ago she restarted metformin.  She slowly tapered up to 1000 mg twice daily.  She denies any persistent side effects.        Objective   Vital Signs:  /63   Pulse 60   Ht 152.4 cm (60\")   Wt 57.5 kg (126 lb 12.8 oz)   SpO2 100%   BMI 24.76 kg/m²   Estimated body mass index is 24.76 kg/m² as calculated from the following:    Height as of this encounter: 152.4 cm (60\").    Weight as of this encounter: 57.5 kg (126 lb 12.8 oz).       BMI is within normal parameters. No other follow-up for BMI required.      Physical Exam  Vitals and nursing note reviewed.   Constitutional:       Appearance: She is well-developed.   HENT:      Head: Normocephalic and atraumatic.      Right Ear: External ear normal.      Left Ear: External ear normal.      Nose: Nose normal.   Eyes:      General: No scleral icterus.     Conjunctiva/sclera: Conjunctivae normal.   Cardiovascular:      Rate and Rhythm: Normal rate and regular rhythm.      Heart sounds: Normal heart sounds.   Pulmonary:      Effort: Pulmonary effort is normal.      Breath sounds: Normal breath sounds.   Musculoskeletal:      Cervical back: Normal range of motion and neck supple.      Right lower leg: No edema.      Left lower leg: No edema.   Skin:     General: Skin is warm and dry.      Findings: No rash.   Neurological:      Mental Status: She is alert and oriented to person, place, and time.   Psychiatric:         Mood and Affect: Mood normal.         Behavior: Behavior normal.         Thought Content: Thought content normal.         Judgment: Judgment normal.      "   Result Review :  The following data was reviewed by: Diana Bauer DO on 11/17/2023:  Common labs          8/14/2023    09:23 11/17/2023    13:15   Common Labs   Glucose 268     BUN 9     Creatinine 0.70     Sodium 135     Potassium 4.6     Chloride 98     Calcium 9.5     Total Protein 6.7     Albumin 4.2     Total Bilirubin <0.2     Alkaline Phosphatase 104     AST (SGOT) 14     ALT (SGPT) 14     WBC 8.6     Hemoglobin 15.4     Hematocrit 44.6     Platelets 167     Total Cholesterol 222     Triglycerides 442     HDL Cholesterol 33     LDL Cholesterol  112     Hemoglobin A1C 9.4  7.0                     Assessment and Plan   Diagnoses and all orders for this visit:    1. Type 2 diabetes mellitus without complication, without long-term current use of insulin (Primary)  -     POC Glycosylated Hemoglobin (Hb A1C)  -     POC Microalbumin  -     Comprehensive Metabolic Panel  -     metFORMIN ER (GLUCOPHAGE-XR) 500 MG 24 hr tablet; Take 1 tablet by mouth 2 (Two) Times a Day With Meals.  Dispense: 180 tablet; Refill: 1    2. Mixed hyperlipidemia  -     atorvastatin (LIPITOR) 40 MG tablet; Take 1 tablet by mouth every night at bedtime.  Dispense: 90 tablet; Refill: 1  -     Lipid Panel    3. Encounter for long-term (current) use of medications  -     Lipid Panel  -     Comprehensive Metabolic Panel  -     TSH    4. Needs flu shot  -     Fluzone >6 Months ([id number])    Other orders  -     albuterol sulfate  (90 Base) MCG/ACT inhaler; Inhale 2 puffs Every 4 (Four) Hours As Needed for Wheezing.  Dispense: 6.7 g; Refill: 0      Patient is here today for chronic stable type 2 diabetes, and hyperlipidemia.  Surveillance labs were obtained today and any medication changes will be made based on lab results and will be called to the patient later this week.        Follow Up   Return in about 3 months (around 2/17/2024) for Diabetes, HTN.  Patient was given instructions and counseling regarding her condition or for  health maintenance advice. Please see specific information pulled into the AVS if appropriate.

## 2023-11-18 LAB
ALBUMIN SERPL-MCNC: 4.4 G/DL (ref 3.8–4.9)
ALBUMIN/GLOB SERPL: 1.8 {RATIO} (ref 1.2–2.2)
ALP SERPL-CCNC: 84 IU/L (ref 44–121)
ALT SERPL-CCNC: 17 IU/L (ref 0–32)
AST SERPL-CCNC: 18 IU/L (ref 0–40)
BILIRUB SERPL-MCNC: 0.4 MG/DL (ref 0–1.2)
BUN SERPL-MCNC: 7 MG/DL (ref 6–24)
BUN/CREAT SERPL: 12 (ref 9–23)
CALCIUM SERPL-MCNC: 9.7 MG/DL (ref 8.7–10.2)
CHLORIDE SERPL-SCNC: 100 MMOL/L (ref 96–106)
CHOLEST SERPL-MCNC: 100 MG/DL (ref 100–199)
CO2 SERPL-SCNC: 23 MMOL/L (ref 20–29)
CREAT SERPL-MCNC: 0.59 MG/DL (ref 0.57–1)
EGFRCR SERPLBLD CKD-EPI 2021: 104 ML/MIN/1.73
GLOBULIN SER CALC-MCNC: 2.5 G/DL (ref 1.5–4.5)
GLUCOSE SERPL-MCNC: 90 MG/DL (ref 70–99)
HDLC SERPL-MCNC: 38 MG/DL
LDLC SERPL CALC-MCNC: 32 MG/DL (ref 0–99)
POTASSIUM SERPL-SCNC: 4.3 MMOL/L (ref 3.5–5.2)
PROT SERPL-MCNC: 6.9 G/DL (ref 6–8.5)
SODIUM SERPL-SCNC: 139 MMOL/L (ref 134–144)
TRIGL SERPL-MCNC: 182 MG/DL (ref 0–149)
TSH SERPL DL<=0.005 MIU/L-ACNC: 0.98 UIU/ML (ref 0.45–4.5)
VLDLC SERPL CALC-MCNC: 30 MG/DL (ref 5–40)

## 2024-01-31 DIAGNOSIS — E11.9 TYPE 2 DIABETES MELLITUS WITHOUT COMPLICATION, WITHOUT LONG-TERM CURRENT USE OF INSULIN: ICD-10-CM

## 2024-01-31 DIAGNOSIS — E78.2 MIXED HYPERLIPIDEMIA: ICD-10-CM

## 2024-01-31 RX ORDER — METFORMIN HYDROCHLORIDE 500 MG/1
500 TABLET, EXTENDED RELEASE ORAL 2 TIMES DAILY WITH MEALS
Qty: 180 TABLET | Refills: 1 | Status: SHIPPED | OUTPATIENT
Start: 2024-01-31

## 2024-01-31 RX ORDER — ATORVASTATIN CALCIUM 40 MG/1
40 TABLET, FILM COATED ORAL
Qty: 90 TABLET | Refills: 1 | Status: SHIPPED | OUTPATIENT
Start: 2024-01-31

## 2024-02-06 ENCOUNTER — OFFICE VISIT (OUTPATIENT)
Dept: FAMILY MEDICINE CLINIC | Facility: CLINIC | Age: 59
End: 2024-02-06
Payer: MEDICARE

## 2024-02-06 VITALS
SYSTOLIC BLOOD PRESSURE: 132 MMHG | OXYGEN SATURATION: 90 % | WEIGHT: 126.2 LBS | TEMPERATURE: 98.2 F | HEIGHT: 60 IN | BODY MASS INDEX: 24.77 KG/M2 | HEART RATE: 90 BPM | DIASTOLIC BLOOD PRESSURE: 72 MMHG

## 2024-02-06 DIAGNOSIS — J06.9 ACUTE URI: Primary | ICD-10-CM

## 2024-02-06 LAB
EXPIRATION DATE: NORMAL
EXPIRATION DATE: NORMAL
FLUAV AG NPH QL: NEGATIVE
FLUBV AG NPH QL: NEGATIVE
INTERNAL CONTROL: NORMAL
INTERNAL CONTROL: NORMAL
Lab: NORMAL
Lab: NORMAL
SARS-COV-2 AG UPPER RESP QL IA.RAPID: NOT DETECTED

## 2024-02-06 PROCEDURE — 87426 SARSCOV CORONAVIRUS AG IA: CPT | Performed by: FAMILY MEDICINE

## 2024-02-06 PROCEDURE — 99213 OFFICE O/P EST LOW 20 MIN: CPT | Performed by: FAMILY MEDICINE

## 2024-02-06 PROCEDURE — 87804 INFLUENZA ASSAY W/OPTIC: CPT | Performed by: FAMILY MEDICINE

## 2024-02-06 RX ORDER — IPRATROPIUM BROMIDE 42 UG/1
2 SPRAY, METERED NASAL 4 TIMES DAILY PRN
Qty: 15 ML | Refills: 0 | Status: SHIPPED | OUTPATIENT
Start: 2024-02-06

## 2024-02-06 RX ORDER — DEXTROMETHORPHAN HYDROBROMIDE AND PROMETHAZINE HYDROCHLORIDE 15; 6.25 MG/5ML; MG/5ML
5 SYRUP ORAL 4 TIMES DAILY PRN
Qty: 180 ML | Refills: 0 | Status: SHIPPED | OUTPATIENT
Start: 2024-02-06

## 2024-02-06 NOTE — PROGRESS NOTES
"Chief Complaint  Nasal Congestion, Dizziness, and Cough    Subjective        Fang Kunz presents to [facility]  History of Present Illness  Patient of Dr. Bauer presents with acute concern of nasal congestion, dizziness, and cough.  Started with Headache and dizziness 4-5 days ago.  No fever or chills.  No ST.  Some congestion.   No ill contacts.   Cough is mildly productive.   No SOA.  OTC cough med has not helped much. (Robitussin)  Dizziness  Associated symptoms include coughing.   Cough        Objective   Vital Signs:  /72 (BP Location: Left arm, Patient Position: Sitting)   Pulse 90   Temp 98.2 °F (36.8 °C) (Oral)   Ht 152.4 cm (60\")   Wt 57.2 kg (126 lb 3.2 oz)   SpO2 90%   BMI 24.65 kg/m²   Estimated body mass index is 24.65 kg/m² as calculated from the following:    Height as of this encounter: 152.4 cm (60\").    Weight as of this encounter: 57.2 kg (126 lb 3.2 oz).       BMI is within normal parameters. No other follow-up for BMI required.      Physical Exam  Constitutional:       General: She is not in acute distress.     Appearance: Normal appearance. She is well-developed.   HENT:      Head: Normocephalic and atraumatic.      Right Ear: Tympanic membrane, ear canal and external ear normal.      Left Ear: Tympanic membrane, ear canal and external ear normal.      Nose: Congestion and rhinorrhea present.      Mouth/Throat:      Mouth: Mucous membranes are moist.      Pharynx: Oropharynx is clear. No oropharyngeal exudate or posterior oropharyngeal erythema.   Eyes:      Conjunctiva/sclera: Conjunctivae normal.      Pupils: Pupils are equal, round, and reactive to light.   Neck:      Thyroid: No thyromegaly.   Cardiovascular:      Rate and Rhythm: Normal rate and regular rhythm.      Heart sounds: No murmur heard.  Pulmonary:      Effort: Pulmonary effort is normal.      Breath sounds: Normal breath sounds. No wheezing.   Musculoskeletal:         General: " Normal range of motion.      Cervical back: Neck supple.   Lymphadenopathy:      Cervical: Cervical adenopathy present.   Skin:     General: Skin is warm and dry.   Neurological:      Mental Status: She is alert and oriented to person, place, and time.   Psychiatric:         Mood and Affect: Mood normal.         Behavior: Behavior normal.        Result Review :          Influenza A -  Influenza B -  Rapid Covid -           Assessment and Plan     Diagnoses and all orders for this visit:    1. Acute URI (Primary)  -     POCT SARS-CoV-2 Antigen POP  -     POC Influenza A / B  -     ipratropium (ATROVENT) 0.06 % nasal spray; 2 sprays into the nostril(s) as directed by provider 4 (Four) Times a Day As Needed for Rhinitis.  Dispense: 15 mL; Refill: 0  -     promethazine-dextromethorphan (PROMETHAZINE-DM) 6.25-15 MG/5ML syrup; Take 5 mL by mouth 4 (Four) Times a Day As Needed for Cough.  Dispense: 180 mL; Refill: 0    URI-symptomatic relief, push fluids and rest, follow-up with no better, expected natural history of illness discussed with patient         Follow Up     No follow-ups on file.  Patient was given instructions and counseling regarding her condition or for health maintenance advice. Please see specific information pulled into the AVS if appropriate.

## 2024-08-20 DIAGNOSIS — E11.9 TYPE 2 DIABETES MELLITUS WITHOUT COMPLICATION, WITHOUT LONG-TERM CURRENT USE OF INSULIN: ICD-10-CM

## 2024-08-20 DIAGNOSIS — E78.2 MIXED HYPERLIPIDEMIA: ICD-10-CM

## 2024-08-22 RX ORDER — METFORMIN HYDROCHLORIDE 500 MG/1
500 TABLET, EXTENDED RELEASE ORAL 2 TIMES DAILY WITH MEALS
Qty: 60 TABLET | Refills: 0 | Status: SHIPPED | OUTPATIENT
Start: 2024-08-22

## 2024-08-22 RX ORDER — ATORVASTATIN CALCIUM 40 MG/1
40 TABLET, FILM COATED ORAL
Qty: 30 TABLET | Refills: 0 | Status: SHIPPED | OUTPATIENT
Start: 2024-08-22

## 2024-09-30 ENCOUNTER — OFFICE VISIT (OUTPATIENT)
Dept: FAMILY MEDICINE CLINIC | Facility: CLINIC | Age: 59
End: 2024-09-30
Payer: MEDICARE

## 2024-09-30 VITALS
HEART RATE: 83 BPM | WEIGHT: 124 LBS | SYSTOLIC BLOOD PRESSURE: 125 MMHG | HEIGHT: 60 IN | BODY MASS INDEX: 24.35 KG/M2 | OXYGEN SATURATION: 97 % | DIASTOLIC BLOOD PRESSURE: 74 MMHG

## 2024-09-30 DIAGNOSIS — E11.9 TYPE 2 DIABETES MELLITUS WITHOUT COMPLICATION, WITHOUT LONG-TERM CURRENT USE OF INSULIN: ICD-10-CM

## 2024-09-30 DIAGNOSIS — R05.9 COUGH, UNSPECIFIED TYPE: ICD-10-CM

## 2024-09-30 DIAGNOSIS — Z79.899 ENCOUNTER FOR LONG-TERM (CURRENT) USE OF MEDICATIONS: ICD-10-CM

## 2024-09-30 DIAGNOSIS — E78.2 MIXED HYPERLIPIDEMIA: ICD-10-CM

## 2024-09-30 DIAGNOSIS — J40 BRONCHITIS: Primary | ICD-10-CM

## 2024-09-30 DIAGNOSIS — K21.9 GASTROESOPHAGEAL REFLUX DISEASE WITHOUT ESOPHAGITIS: ICD-10-CM

## 2024-09-30 LAB
EXPIRATION DATE: ABNORMAL
EXPIRATION DATE: NORMAL
FLUAV AG UPPER RESP QL IA.RAPID: NOT DETECTED
FLUBV AG UPPER RESP QL IA.RAPID: NOT DETECTED
HBA1C MFR BLD: 6.8 % (ref 4.5–5.7)
INTERNAL CONTROL: NORMAL
Lab: ABNORMAL
Lab: NORMAL
SARS-COV-2 AG UPPER RESP QL IA.RAPID: NOT DETECTED

## 2024-09-30 PROCEDURE — 87428 SARSCOV & INF VIR A&B AG IA: CPT | Performed by: FAMILY MEDICINE

## 2024-09-30 PROCEDURE — 1160F RVW MEDS BY RX/DR IN RCRD: CPT | Performed by: FAMILY MEDICINE

## 2024-09-30 PROCEDURE — 3044F HG A1C LEVEL LT 7.0%: CPT | Performed by: FAMILY MEDICINE

## 2024-09-30 PROCEDURE — 83036 HEMOGLOBIN GLYCOSYLATED A1C: CPT | Performed by: FAMILY MEDICINE

## 2024-09-30 PROCEDURE — 1126F AMNT PAIN NOTED NONE PRSNT: CPT | Performed by: FAMILY MEDICINE

## 2024-09-30 PROCEDURE — 99214 OFFICE O/P EST MOD 30 MIN: CPT | Performed by: FAMILY MEDICINE

## 2024-09-30 PROCEDURE — 1159F MED LIST DOCD IN RCRD: CPT | Performed by: FAMILY MEDICINE

## 2024-09-30 RX ORDER — ATORVASTATIN CALCIUM 40 MG/1
40 TABLET, FILM COATED ORAL
Qty: 90 TABLET | Refills: 0 | Status: SHIPPED | OUTPATIENT
Start: 2024-09-30

## 2024-09-30 RX ORDER — METFORMIN HCL 500 MG
500 TABLET, EXTENDED RELEASE 24 HR ORAL 2 TIMES DAILY WITH MEALS
Qty: 180 TABLET | Refills: 0 | Status: SHIPPED | OUTPATIENT
Start: 2024-09-30

## 2024-09-30 RX ORDER — DEXTROMETHORPHAN HYDROBROMIDE AND PROMETHAZINE HYDROCHLORIDE 15; 6.25 MG/5ML; MG/5ML
5 SYRUP ORAL 4 TIMES DAILY PRN
Qty: 180 ML | Refills: 0 | Status: SHIPPED | OUTPATIENT
Start: 2024-09-30

## 2024-09-30 RX ORDER — PANTOPRAZOLE SODIUM 20 MG/1
20 TABLET, DELAYED RELEASE ORAL DAILY
Qty: 90 TABLET | Refills: 0 | Status: SHIPPED | OUTPATIENT
Start: 2024-09-30

## 2024-09-30 RX ORDER — DOXYCYCLINE 100 MG/1
100 CAPSULE ORAL 2 TIMES DAILY
Qty: 20 CAPSULE | Refills: 0 | Status: SHIPPED | OUTPATIENT
Start: 2024-09-30

## 2024-09-30 NOTE — PROGRESS NOTES
"Chief Complaint  Sinusitis, URI, and Fever    Subjective        Fang Kunz presents to [facility]  History of Present Illness  Patient is here to follow-up on her chronic medical conditions:    Hyperlipidemia.  The patient is compliant with taking atorvastatin 40 mg daily.   She denies side effects.  She has been doing better since she was last seen.    Type 2 diabetes.  Patient is currently taking metformin 500 mg twice daily.  She denies any persistent side effects.      Patient is also here for a new problem.  She started with sinus pressure, cough, feverish, body aches, fatigue about 4 days ago.  No Soa or CP.  Some MCKNIGHT.  Still smoking cigarettes.        Objective   Vital Signs:  /74   Pulse 83   Ht 152.4 cm (60\")   Wt 56.2 kg (124 lb)   SpO2 97%   BMI 24.22 kg/m²   Estimated body mass index is 24.22 kg/m² as calculated from the following:    Height as of this encounter: 152.4 cm (60\").    Weight as of this encounter: 56.2 kg (124 lb).    BMI is within normal parameters. No other follow-up for BMI required.      Physical Exam  Vitals and nursing note reviewed.   Constitutional:       Appearance: Normal appearance. She is well-developed.   HENT:      Head: Normocephalic and atraumatic.      Right Ear: Tympanic membrane, ear canal and external ear normal.      Left Ear: Tympanic membrane, ear canal and external ear normal.      Nose: Rhinorrhea present.      Mouth/Throat:      Mouth: Mucous membranes are moist.      Pharynx: Posterior oropharyngeal erythema present.   Eyes:      General: No scleral icterus.     Conjunctiva/sclera: Conjunctivae normal.   Cardiovascular:      Rate and Rhythm: Normal rate and regular rhythm.      Heart sounds: Normal heart sounds.   Pulmonary:      Effort: Pulmonary effort is normal.      Breath sounds: Normal breath sounds.   Musculoskeletal:      Cervical back: Normal range of motion and neck supple.      Right lower leg: No edema.    "   Left lower leg: No edema.   Lymphadenopathy:      Cervical: No cervical adenopathy.   Skin:     General: Skin is warm and dry.      Findings: No rash.   Neurological:      Mental Status: She is alert and oriented to person, place, and time.   Psychiatric:         Mood and Affect: Mood normal.         Behavior: Behavior normal.         Thought Content: Thought content normal.         Judgment: Judgment normal.        Result Review :  The following data was reviewed by: Diana Bauer DO on 09/30/2024:  Common labs          11/17/2023    13:15 11/17/2023    13:37 9/30/2024    15:03   Common Labs   Glucose  90     BUN  7     Creatinine  0.59     Sodium  139     Potassium  4.3     Chloride  100     Calcium  9.7     Total Protein  6.9     Albumin  4.4     Total Bilirubin  0.4     Alkaline Phosphatase  84     AST (SGOT)  18     ALT (SGPT)  17     Total Cholesterol  100     Triglycerides  182     HDL Cholesterol  38     LDL Cholesterol   32     Hemoglobin A1C 7.0   6.8      TSH          11/17/2023    13:37   TSH   TSH 0.980                Assessment and Plan   Diagnoses and all orders for this visit:    1. Bronchitis (Primary)  -     doxycycline (VIBRAMYCIN) 100 MG capsule; Take 1 capsule by mouth 2 (Two) Times a Day.  Dispense: 20 capsule; Refill: 0  -     promethazine-dextromethorphan (PROMETHAZINE-DM) 6.25-15 MG/5ML syrup; Take 5 mL by mouth 4 (Four) Times a Day As Needed for Cough.  Dispense: 180 mL; Refill: 0    2. Cough, unspecified type  -     POC Glycosylated Hemoglobin (Hb A1C)  -     POCT SARS-CoV-2 Antigen POP + Flu    3. Type 2 diabetes mellitus without complication, without long-term current use of insulin  -     POC Glycosylated Hemoglobin (Hb A1C)  -     POCT SARS-CoV-2 Antigen POP + Flu  -     Comprehensive Metabolic Panel  -     metFORMIN ER (GLUCOPHAGE-XR) 500 MG 24 hr tablet; Take 1 tablet by mouth 2 (Two) Times a Day With Meals.  Dispense: 180 tablet; Refill: 0    4. Mixed hyperlipidemia  -      Lipid Panel  -     atorvastatin (LIPITOR) 40 MG tablet; Take 1 tablet by mouth every night at bedtime.  Dispense: 90 tablet; Refill: 0    5. Gastroesophageal reflux disease without esophagitis  -     pantoprazole (PROTONIX) 20 MG EC tablet; Take 1 tablet by mouth Daily.  Dispense: 90 tablet; Refill: 0    6. Encounter for long-term (current) use of medications  -     Comprehensive Metabolic Panel  -     CBC & Differential  -     TSH  -     Lipid Panel    Patient is here today for an acute bronchitis.  In office influenza A, influenza B, and COVID test are all negative.  Patient is a smoker and having MCKNIGHT so I will empirically treat for bronchitis with antibiotics.  Patient was advised to drink plenty of fluids and rest.  Patient was also given Promethazine DM to help with the cough.  Patient was advised to monitor symptoms closely and follow-up or seek more urgent medical care if worsening.  Also advised to continue wearing a mask until they are feeling better.     Patient is also here for chronic stable type 2 diabetes and hyperlipidemia and GERD.  Surveillance labs were obtained today and any medication changes will be made based on lab results and will be called to the patient later this week.          Follow Up   Return for Medicare Wellness.  Patient was given instructions and counseling regarding her condition or for health maintenance advice. Please see specific information pulled into the AVS if appropriate.

## 2024-10-01 LAB
ALBUMIN SERPL-MCNC: 4.2 G/DL (ref 3.8–4.9)
ALP SERPL-CCNC: 70 IU/L (ref 44–121)
ALT SERPL-CCNC: 18 IU/L (ref 0–32)
AST SERPL-CCNC: 20 IU/L (ref 0–40)
BASOPHILS # BLD AUTO: 0 X10E3/UL (ref 0–0.2)
BASOPHILS NFR BLD AUTO: 0 %
BILIRUB SERPL-MCNC: <0.2 MG/DL (ref 0–1.2)
BUN SERPL-MCNC: 6 MG/DL (ref 6–24)
BUN/CREAT SERPL: 11 (ref 9–23)
CALCIUM SERPL-MCNC: 9.4 MG/DL (ref 8.7–10.2)
CHLORIDE SERPL-SCNC: 104 MMOL/L (ref 96–106)
CHOLEST SERPL-MCNC: 102 MG/DL (ref 100–199)
CO2 SERPL-SCNC: 22 MMOL/L (ref 20–29)
CREAT SERPL-MCNC: 0.53 MG/DL (ref 0.57–1)
EGFRCR SERPLBLD CKD-EPI 2021: 106 ML/MIN/1.73
EOSINOPHIL # BLD AUTO: 0.1 X10E3/UL (ref 0–0.4)
EOSINOPHIL NFR BLD AUTO: 1 %
ERYTHROCYTE [DISTWIDTH] IN BLOOD BY AUTOMATED COUNT: 12.5 % (ref 11.7–15.4)
GLOBULIN SER CALC-MCNC: 2.6 G/DL (ref 1.5–4.5)
GLUCOSE SERPL-MCNC: 100 MG/DL (ref 70–99)
HCT VFR BLD AUTO: 41.9 % (ref 34–46.6)
HDLC SERPL-MCNC: 37 MG/DL
HGB BLD-MCNC: 14.4 G/DL (ref 11.1–15.9)
IMM GRANULOCYTES # BLD AUTO: 0 X10E3/UL (ref 0–0.1)
IMM GRANULOCYTES NFR BLD AUTO: 0 %
LDLC SERPL CALC-MCNC: 40 MG/DL (ref 0–99)
LYMPHOCYTES # BLD AUTO: 5.1 X10E3/UL (ref 0.7–3.1)
LYMPHOCYTES NFR BLD AUTO: 53 %
MCH RBC QN AUTO: 33.8 PG (ref 26.6–33)
MCHC RBC AUTO-ENTMCNC: 34.4 G/DL (ref 31.5–35.7)
MCV RBC AUTO: 98 FL (ref 79–97)
MONOCYTES # BLD AUTO: 0.4 X10E3/UL (ref 0.1–0.9)
MONOCYTES NFR BLD AUTO: 4 %
NEUTROPHILS # BLD AUTO: 4 X10E3/UL (ref 1.4–7)
NEUTROPHILS NFR BLD AUTO: 42 %
PLATELET # BLD AUTO: 216 X10E3/UL (ref 150–450)
POTASSIUM SERPL-SCNC: 4 MMOL/L (ref 3.5–5.2)
PROT SERPL-MCNC: 6.8 G/DL (ref 6–8.5)
RBC # BLD AUTO: 4.26 X10E6/UL (ref 3.77–5.28)
SODIUM SERPL-SCNC: 140 MMOL/L (ref 134–144)
TRIGL SERPL-MCNC: 142 MG/DL (ref 0–149)
TSH SERPL DL<=0.005 MIU/L-ACNC: 1.08 UIU/ML (ref 0.45–4.5)
VLDLC SERPL CALC-MCNC: 25 MG/DL (ref 5–40)
WBC # BLD AUTO: 9.7 X10E3/UL (ref 3.4–10.8)

## 2024-10-08 DIAGNOSIS — J40 BRONCHITIS: ICD-10-CM

## 2024-10-08 RX ORDER — DOXYCYCLINE 100 MG/1
100 CAPSULE ORAL 2 TIMES DAILY
Qty: 20 CAPSULE | Refills: 0 | OUTPATIENT
Start: 2024-10-08

## 2024-10-08 RX ORDER — DEXTROMETHORPHAN HYDROBROMIDE AND PROMETHAZINE HYDROCHLORIDE 15; 6.25 MG/5ML; MG/5ML
5 SYRUP ORAL 4 TIMES DAILY PRN
Qty: 180 ML | Refills: 0 | OUTPATIENT
Start: 2024-10-08

## 2024-10-08 NOTE — TELEPHONE ENCOUNTER
Caller: Fang Kunz    Relationship: Self    Best call back number: [phone]     Requested Prescriptions:   Requested Prescriptions     Pending Prescriptions Disp Refills    doxycycline (VIBRAMYCIN) 100 MG capsule 20 capsule 0     Sig: Take 1 capsule by mouth 2 (Two) Times a Day.    promethazine-dextromethorphan (PROMETHAZINE-DM) 6.25-15 MG/5ML syrup 180 mL 0     Sig: Take 5 mL by mouth 4 (Four) Times a Day As Needed for Cough.        Pharmacy where request should be sent: [facility] [address] - [phone]  - [phone]     Last office visit with prescribing clinician: 9/30/2024   Last telemedicine visit with prescribing clinician: Visit date not found   Next office visit with prescribing clinician: 12/27/2024     Additional details provided by patient: PATIENT CALLED AND SHE IS STILL HAVING SAME SYMPTOMS, GETTING LIGHTER BUT ALMOST OUT OF THIS MEDICATION WANTS TO KNOW IF DR WHITE WILL REFILL.      Does the patient have less than a 3 day supply:  [x] Yes  [] No    Would you like a call back once the refill request has been completed: [] Yes [] No    If the office needs to give you a call back, can they leave a voicemail: [] Yes [] No    Christina Coyne Rep   10/08/24 09:43 EDT

## 2024-10-09 ENCOUNTER — OFFICE VISIT (OUTPATIENT)
Dept: FAMILY MEDICINE CLINIC | Facility: CLINIC | Age: 59
End: 2024-10-09
Payer: MEDICARE

## 2024-10-09 VITALS
DIASTOLIC BLOOD PRESSURE: 76 MMHG | OXYGEN SATURATION: 96 % | HEIGHT: 60 IN | BODY MASS INDEX: 24.26 KG/M2 | HEART RATE: 104 BPM | WEIGHT: 123.6 LBS | SYSTOLIC BLOOD PRESSURE: 122 MMHG

## 2024-10-09 DIAGNOSIS — J45.21 MILD INTERMITTENT REACTIVE AIRWAY DISEASE WITH ACUTE EXACERBATION: ICD-10-CM

## 2024-10-09 DIAGNOSIS — J40 BRONCHITIS: Primary | ICD-10-CM

## 2024-10-09 PROCEDURE — 1126F AMNT PAIN NOTED NONE PRSNT: CPT | Performed by: FAMILY MEDICINE

## 2024-10-09 PROCEDURE — 99214 OFFICE O/P EST MOD 30 MIN: CPT | Performed by: FAMILY MEDICINE

## 2024-10-09 PROCEDURE — 3044F HG A1C LEVEL LT 7.0%: CPT | Performed by: FAMILY MEDICINE

## 2024-10-09 PROCEDURE — 1159F MED LIST DOCD IN RCRD: CPT | Performed by: FAMILY MEDICINE

## 2024-10-09 PROCEDURE — 1160F RVW MEDS BY RX/DR IN RCRD: CPT | Performed by: FAMILY MEDICINE

## 2024-10-09 RX ORDER — DOXYCYCLINE 100 MG/1
100 CAPSULE ORAL 2 TIMES DAILY
Qty: 20 CAPSULE | Refills: 0 | OUTPATIENT
Start: 2024-10-09

## 2024-10-09 RX ORDER — DEXTROMETHORPHAN HYDROBROMIDE AND PROMETHAZINE HYDROCHLORIDE 15; 6.25 MG/5ML; MG/5ML
SYRUP ORAL
Qty: 180 ML | Refills: 0 | Status: SHIPPED | OUTPATIENT
Start: 2024-10-09

## 2024-10-09 NOTE — PROGRESS NOTES
"Chief Complaint  Cough (Feeling better but still congested and coughing) and URI    Subjective        Fang Kunz presents to [facility]  History of Present Illness  Patient is here today to follow-up on an upper respiratory infection that she started having a few weeks ago.  She was seen in the office at that time and given doxycycline and a cough syrup.  She states that overall she is feeling a little better but she still has a persistent cough.  She is not feeling worse and denies any worsening shortness of breath.  She denies any fever or chills.  It is just that she is not getting over the cough.  She is still a daily smoker although she has cut back.  She has only used her albuterol inhaler intermittently here and there for when she is wheezing.  Patient states that she has been slightly short of breath with activity.      Objective   Vital Signs:  /76   Pulse 104   Ht 152.4 cm (60\")   Wt 56.1 kg (123 lb 9.6 oz)   SpO2 96%   BMI 24.14 kg/m²   Estimated body mass index is 24.14 kg/m² as calculated from the following:    Height as of this encounter: 152.4 cm (60\").    Weight as of this encounter: 56.1 kg (123 lb 9.6 oz).    BMI is within normal parameters. No other follow-up for BMI required.      Physical Exam  Vitals and nursing note reviewed.   Constitutional:       Appearance: Normal appearance. She is well-developed and normal weight.   HENT:      Head: Normocephalic and atraumatic.   Eyes:      General: No scleral icterus.     Conjunctiva/sclera: Conjunctivae normal.   Cardiovascular:      Rate and Rhythm: Normal rate and regular rhythm.      Heart sounds: Normal heart sounds.   Pulmonary:      Effort: Pulmonary effort is normal.      Breath sounds: Normal breath sounds.      Comments: Coarse breath sounds  Musculoskeletal:         General: Normal range of motion.      Cervical back: Normal range of motion and neck supple.      Right lower leg: No edema.      " Left lower leg: No edema.   Skin:     General: Skin is warm and dry.      Capillary Refill: Capillary refill takes less than 2 seconds.      Findings: No rash.   Neurological:      Mental Status: She is alert and oriented to person, place, and time.   Psychiatric:         Mood and Affect: Mood normal.         Behavior: Behavior normal.         Thought Content: Thought content normal.         Judgment: Judgment normal.        Result Review :                Assessment and Plan   Diagnoses and all orders for this visit:    1. Bronchitis (Primary)    2. Mild intermittent reactive airway disease with acute exacerbation    Patient is here today with persistent cough after recent bronchitis and history of smoking.  This is likely due to an exacerbation of reactive airway.  I have given the patient a sample of Trelegy and advised her to use 1 puff daily x14 days.  She may use the rescue inhaler as needed.  She should follow-up if symptoms persist or worsen.         Follow Up   Return if symptoms worsen or fail to improve.  Patient was given instructions and counseling regarding her condition or for health maintenance advice. Please see specific information pulled into the AVS if appropriate.

## 2024-12-16 ENCOUNTER — OFFICE VISIT (OUTPATIENT)
Dept: FAMILY MEDICINE CLINIC | Facility: CLINIC | Age: 59
End: 2024-12-16
Payer: MEDICARE

## 2024-12-16 VITALS
DIASTOLIC BLOOD PRESSURE: 60 MMHG | OXYGEN SATURATION: 97 % | SYSTOLIC BLOOD PRESSURE: 118 MMHG | HEIGHT: 60 IN | HEART RATE: 97 BPM | WEIGHT: 128.2 LBS | TEMPERATURE: 97.7 F | BODY MASS INDEX: 25.17 KG/M2

## 2024-12-16 DIAGNOSIS — R09.81 NASAL CONGESTION: Primary | ICD-10-CM

## 2024-12-16 DIAGNOSIS — J40 BRONCHITIS: ICD-10-CM

## 2024-12-16 DIAGNOSIS — J02.9 SORE THROAT: ICD-10-CM

## 2024-12-16 DIAGNOSIS — J01.00 ACUTE MAXILLARY SINUSITIS, RECURRENCE NOT SPECIFIED: ICD-10-CM

## 2024-12-16 LAB
EXPIRATION DATE: NORMAL
FLUAV AG NPH QL: NEGATIVE
FLUBV AG NPH QL: NEGATIVE
HETEROPH AB SER QL LA: NEGATIVE
INTERNAL CONTROL: NORMAL
Lab: NORMAL
S PYO AG THROAT QL: NEGATIVE
SARS-COV-2 AG UPPER RESP QL IA.RAPID: NOT DETECTED

## 2024-12-16 PROCEDURE — 87804 INFLUENZA ASSAY W/OPTIC: CPT | Performed by: NURSE PRACTITIONER

## 2024-12-16 PROCEDURE — 1126F AMNT PAIN NOTED NONE PRSNT: CPT | Performed by: NURSE PRACTITIONER

## 2024-12-16 PROCEDURE — 3044F HG A1C LEVEL LT 7.0%: CPT | Performed by: NURSE PRACTITIONER

## 2024-12-16 PROCEDURE — 87426 SARSCOV CORONAVIRUS AG IA: CPT | Performed by: NURSE PRACTITIONER

## 2024-12-16 PROCEDURE — 87880 STREP A ASSAY W/OPTIC: CPT | Performed by: NURSE PRACTITIONER

## 2024-12-16 PROCEDURE — 99214 OFFICE O/P EST MOD 30 MIN: CPT | Performed by: NURSE PRACTITIONER

## 2024-12-16 PROCEDURE — 86308 HETEROPHILE ANTIBODY SCREEN: CPT | Performed by: NURSE PRACTITIONER

## 2024-12-16 RX ORDER — DOXYCYCLINE 100 MG/1
100 CAPSULE ORAL 2 TIMES DAILY
Qty: 20 CAPSULE | Refills: 0 | Status: SHIPPED | OUTPATIENT
Start: 2024-12-16

## 2024-12-16 RX ORDER — DEXTROMETHORPHAN HYDROBROMIDE AND PROMETHAZINE HYDROCHLORIDE 15; 6.25 MG/5ML; MG/5ML
5 SYRUP ORAL 4 TIMES DAILY PRN
Qty: 180 ML | Refills: 0 | Status: SHIPPED | OUTPATIENT
Start: 2024-12-16

## 2024-12-16 NOTE — PROGRESS NOTES
"Chief Complaint  Nasal Congestion (Since Saturday had went away but came back, feels like her head is\"going to blow off\" ), Headache, and Fever    Subjective        Fang Kunz presents to [facility]  History of Present Illness    Patient is a patient of Dr. Diana Bauer.  She presents today with complaint of nasal congestion headache, and fever since Saturday.    Reports fever, cough, ear ache, sore throat, chest tightness  Denies nausea, vomiting, diarrhea, shortness of breath    OTC:mucinex    Objective   Vital Signs:  /60   Pulse 97   Temp 97.7 °F (36.5 °C)   Ht 152.4 cm (60\")   Wt 58.2 kg (128 lb 3.2 oz)   SpO2 97%   BMI 25.04 kg/m²   Estimated body mass index is 25.04 kg/m² as calculated from the following:    Height as of this encounter: 152.4 cm (60\").    Weight as of this encounter: 58.2 kg (128 lb 3.2 oz).          Physical Exam  Constitutional:       Appearance: Normal appearance.   HENT:      Head: Normocephalic and atraumatic.      Right Ear: Tympanic membrane has decreased mobility.      Left Ear: Tympanic membrane has decreased mobility.      Nose: Rhinorrhea present.      Right Sinus: Maxillary sinus tenderness present.      Left Sinus: Maxillary sinus tenderness present.      Mouth/Throat:      Pharynx: Postnasal drip present.   Cardiovascular:      Rate and Rhythm: Normal rate and regular rhythm.      Pulses: Normal pulses.   Pulmonary:      Effort: Pulmonary effort is normal.      Comments: Bronchial sounds are noted  Skin:     General: Skin is warm and dry.   Neurological:      Mental Status: She is alert.   Psychiatric:         Mood and Affect: Mood normal.         Behavior: Behavior normal.         Thought Content: Thought content normal.         Judgment: Judgment normal.        Result Review :             Influenza A and B are negative  COVID is negative   Mono is negative  Strep is negative       Assessment and Plan   Diagnoses and all orders for " this visit:    1. Nasal congestion (Primary)  -     POCT SARS-CoV-2 Antigen POP  -     POCT Influenza A/B    2. Sore throat  -     POCT Infectious mononucleosis antibody  -     POCT rapid strep A    3. Bronchitis  -     doxycycline (VIBRAMYCIN) 100 MG capsule; Take 1 capsule by mouth 2 (Two) Times a Day.  Dispense: 20 capsule; Refill: 0  -     promethazine-dextromethorphan (PROMETHAZINE-DM) 6.25-15 MG/5ML syrup; Take 5 mL by mouth 4 (Four) Times a Day As Needed for Cough.  Dispense: 180 mL; Refill: 0    4. Acute maxillary sinusitis, recurrence not specified    Treating for bronchitis and sinusitis.  Start antibiotic.  Cough medicine given.  If no improvement in symptoms please notify provider.  Continue to use albuterol as needed.         Follow Up   No follow-ups on file.  Patient was given instructions and counseling regarding her condition or for health maintenance advice. Please see specific information pulled into the AVS if appropriate.

## 2025-04-08 DIAGNOSIS — K21.9 GASTROESOPHAGEAL REFLUX DISEASE WITHOUT ESOPHAGITIS: ICD-10-CM

## 2025-04-08 RX ORDER — PANTOPRAZOLE SODIUM 20 MG/1
20 TABLET, DELAYED RELEASE ORAL DAILY
Qty: 30 TABLET | Refills: 0 | Status: SHIPPED | OUTPATIENT
Start: 2025-04-08

## 2025-04-08 NOTE — TELEPHONE ENCOUNTER
Rx Refill Note  Requested Prescriptions     Pending Prescriptions Disp Refills    pantoprazole (PROTONIX) 20 MG EC tablet [Pharmacy Med Name: pantoprazole 20 mg tablet,delayed release] 90 tablet 0     Sig: TAKE ONE TABLET BY MOUTH DAILY      Last office visit with prescribing clinician: 10/9/2024   Last telemedicine visit with prescribing clinician: Visit date not found   Next office visit with prescribing clinician: Visit date not found                         Would you like a call back once the refill request has been completed: [] Yes [] No    If the office needs to give you a call back, can they leave a voicemail: [] Yes [] No    Darby Robertson MA  04/08/25, 11:10 EDT

## 2025-04-22 ENCOUNTER — TELEPHONE (OUTPATIENT)
Dept: FAMILY MEDICINE CLINIC | Facility: CLINIC | Age: 60
End: 2025-04-22

## 2025-04-22 ENCOUNTER — OFFICE VISIT (OUTPATIENT)
Dept: FAMILY MEDICINE CLINIC | Facility: CLINIC | Age: 60
End: 2025-04-22
Payer: MEDICARE

## 2025-04-22 VITALS
DIASTOLIC BLOOD PRESSURE: 71 MMHG | SYSTOLIC BLOOD PRESSURE: 126 MMHG | OXYGEN SATURATION: 100 % | BODY MASS INDEX: 25.68 KG/M2 | HEART RATE: 57 BPM | WEIGHT: 130.8 LBS | HEIGHT: 60 IN

## 2025-04-22 DIAGNOSIS — E11.9 TYPE 2 DIABETES MELLITUS WITHOUT COMPLICATION, WITHOUT LONG-TERM CURRENT USE OF INSULIN: ICD-10-CM

## 2025-04-22 DIAGNOSIS — Z79.899 ENCOUNTER FOR LONG-TERM (CURRENT) USE OF MEDICATIONS: ICD-10-CM

## 2025-04-22 DIAGNOSIS — Z12.31 SCREENING MAMMOGRAM FOR BREAST CANCER: ICD-10-CM

## 2025-04-22 DIAGNOSIS — Z00.00 MEDICARE ANNUAL WELLNESS VISIT, SUBSEQUENT: Primary | ICD-10-CM

## 2025-04-22 DIAGNOSIS — E78.2 MIXED HYPERLIPIDEMIA: ICD-10-CM

## 2025-04-22 DIAGNOSIS — K21.9 GASTROESOPHAGEAL REFLUX DISEASE WITHOUT ESOPHAGITIS: ICD-10-CM

## 2025-04-22 LAB
EXPIRATION DATE: ABNORMAL
EXPIRATION DATE: NORMAL
HBA1C MFR BLD: 7.7 % (ref 4.5–5.7)
Lab: ABNORMAL
Lab: NORMAL
MICROALBUMIN/CREAT UR: 0.6 MG/G (ref 0–29)
POC CREATININE URINE: 50
POC MICROALBUMIN URINE: 30

## 2025-04-22 RX ORDER — PANTOPRAZOLE SODIUM 20 MG/1
20 TABLET, DELAYED RELEASE ORAL DAILY
Qty: 90 TABLET | Refills: 1 | Status: SHIPPED | OUTPATIENT
Start: 2025-04-22

## 2025-04-22 RX ORDER — METFORMIN HYDROCHLORIDE 500 MG/1
1000 TABLET, EXTENDED RELEASE ORAL 2 TIMES DAILY WITH MEALS
Qty: 360 TABLET | Refills: 1 | Status: SHIPPED | OUTPATIENT
Start: 2025-04-22

## 2025-04-22 RX ORDER — METFORMIN HYDROCHLORIDE 500 MG/1
500 TABLET, EXTENDED RELEASE ORAL 2 TIMES DAILY WITH MEALS
Qty: 360 TABLET | Refills: 1 | Status: SHIPPED | OUTPATIENT
Start: 2025-04-22 | End: 2025-04-22 | Stop reason: SDUPTHER

## 2025-04-22 RX ORDER — ATORVASTATIN CALCIUM 40 MG/1
40 TABLET, FILM COATED ORAL
Qty: 90 TABLET | Refills: 1 | Status: SHIPPED | OUTPATIENT
Start: 2025-04-22

## 2025-04-22 NOTE — ASSESSMENT & PLAN NOTE
Orders:    atorvastatin (LIPITOR) 40 MG tablet; Take 1 tablet by mouth every night at bedtime.

## 2025-04-22 NOTE — PROGRESS NOTES
Subjective   The ABCs of the Annual Wellness Visit  Medicare Wellness Visit      Fang Kunz is a 59 y.o. patient who presents for a Medicare Wellness Visit.    The following portions of the patient's history were reviewed and   updated as appropriate: allergies, current medications, past family history, past medical history, past social history, past surgical history, and problem list.    Compared to one year ago, the patient's physical   health is the same.  Compared to one year ago, the patient's mental   health is the same.    Recent Hospitalizations:  She was not admitted to the hospital during the last year.     Current Medical Providers:  Patient Care Team:  Diana Bauer DO as PCP - General (Family Medicine)    Outpatient Medications Prior to Visit   Medication Sig Dispense Refill    albuterol sulfate  (90 Base) MCG/ACT inhaler Inhale 2 puffs Every 4 (Four) Hours As Needed for Wheezing. 6.7 g 0    clobetasol (TEMOVATE) 0.05 % cream   5    cyclobenzaprine (FLEXERIL) 10 MG tablet       gabapentin (NEURONTIN) 600 MG tablet TAKE ONE TABLET BY MOUTH EVERY 6 HOURS FOR 30 DAYS      ipratropium (ATROVENT) 0.06 % nasal spray 2 sprays into the nostril(s) as directed by provider 4 (Four) Times a Day As Needed for Rhinitis. 15 mL 0    meloxicam (Mobic) 15 MG tablet Take 1 tablet by mouth Daily. 30 tablet 1    oxyCODONE-acetaminophen (PERCOCET) 7.5-325 MG per tablet Take 1 tablet by mouth Every 6 (Six) Hours As Needed.      atorvastatin (LIPITOR) 40 MG tablet Take 1 tablet by mouth every night at bedtime. 90 tablet 0    metFORMIN ER (GLUCOPHAGE-XR) 500 MG 24 hr tablet Take 1 tablet by mouth 2 (Two) Times a Day With Meals. 180 tablet 0    pantoprazole (PROTONIX) 20 MG EC tablet TAKE ONE TABLET BY MOUTH DAILY 30 tablet 0    doxycycline (VIBRAMYCIN) 100 MG capsule Take 1 capsule by mouth 2 (Two) Times a Day. (Patient not taking: Reported on 4/22/2025) 20 capsule 0    promethazine-dextromethorphan  "(PROMETHAZINE-DM) 6.25-15 MG/5ML syrup Take 5 mL by mouth 4 (Four) Times a Day As Needed for Cough. (Patient not taking: Reported on 4/22/2025) 180 mL 0     No facility-administered medications prior to visit.     Opioid medication/s are on active medication list.  and I have evaluated her active treatment plan and pain score trends (see table).  Vitals:    04/22/25 1030   PainSc: 8    PainLoc: Back     I have reviewed the chart for potential of high risk medication and harmful drug interactions in the elderly.        Aspirin is not on active medication list.  Aspirin use is not indicated based on review of current medical condition/s. Risk of harm outweighs potential benefits.  .    Patient Active Problem List   Diagnosis    Hyperlipidemia    GERD without esophagitis    Type 2 diabetes mellitus without complication, without long-term current use of insulin    Encounter for long-term (current) use of medications     Advance Care Planning Advance Directive is not on file.  ACP discussion was held with the patient during this visit. Patient does not have an advance directive, information provided.            Objective   Vitals:    04/22/25 1030   BP: 126/71   Pulse: 57   SpO2: 100%   Weight: 59.3 kg (130 lb 12.8 oz)   Height: 152.4 cm (60\")   PainSc: 8    PainLoc: Back       Estimated body mass index is 25.55 kg/m² as calculated from the following:    Height as of this encounter: 152.4 cm (60\").    Weight as of this encounter: 59.3 kg (130 lb 12.8 oz).    BMI is >= 25 and <30. (Overweight) The following options were offered after discussion;: exercise counseling/recommendations and nutrition counseling/recommendations           Does the patient have evidence of cognitive impairment? No  Lab Results   Component Value Date    HGBA1C 7.7 (A) 04/22/2025                                                                                                Health  Risk Assessment    Smoking Status:  Social History     Tobacco Use "   Smoking Status Former    Current packs/day: 0.00    Average packs/day: 0.5 packs/day for 45.0 years (22.5 ttl pk-yrs)    Types: Cigarettes    Start date:     Quit date:     Years since quittin.3   Smokeless Tobacco Never   Tobacco Comments    1/2 PPD x 15+ years     Alcohol Consumption:  Social History     Substance and Sexual Activity   Alcohol Use Not Currently    Comment: rare       Fall Risk Screen  STEADI Fall Risk Assessment was completed, and patient is at MODERATE risk for falls. Assessment completed on:2025    Depression Screening   Little interest or pleasure in doing things? Not at all   Feeling down, depressed, or hopeless? Not at all   PHQ-2 Total Score 0      Health Habits and Functional and Cognitive Screenin/22/2025    10:27 AM   Functional & Cognitive Status   Do you have difficulty preparing food and eating? No   Do you have difficulty bathing yourself, getting dressed or grooming yourself? No   Do you have difficulty using the toilet? No   Do you have difficulty moving around from place to place? Yes   Do you have trouble with steps or getting out of a bed or a chair? Yes   Current Diet Diabetic Diet   Dental Exam Up to date   Eye Exam Not up to date   Exercise (times per week) 0 times per week   Current Exercises Include No Regular Exercise   Do you need help using the phone?  No   Are you deaf or do you have serious difficulty hearing?  No   Do you need help to go to places out of walking distance? Yes   Do you need help shopping? No   Do you need help preparing meals?  Yes   Do you need help with housework?  Yes   Do you need help with laundry? Yes   Do you need help taking your medications? No   Do you need help managing money? No   Do you ever drive or ride in a car without wearing a seat belt? No   Have you felt unusual stress, anger or loneliness in the last month? No   Who do you live with? Alone   If you need help, do you have trouble finding someone available  to you? No   Have you been bothered in the last four weeks by sexual problems? No   Do you have difficulty concentrating, remembering or making decisions? No           Age-appropriate Screening Schedule:  Refer to the list below for future screening recommendations based on patient's age, sex and/or medical conditions. Orders for these recommended tests are listed in the plan section. The patient has been provided with a written plan.    Health Maintenance List  Health Maintenance   Topic Date Due    MAMMOGRAM  03/17/2023    Pneumococcal Vaccine 50+ (2 of 2 - PCV) 10/19/2025 (Originally 3/25/2022)    TDAP/TD VACCINES (1 - Tdap) 10/19/2025 (Originally 10/15/1983)    ZOSTER VACCINE (1 of 2) 10/19/2025 (Originally 10/15/2014)    COVID-19 Vaccine (3 - 2024-25 season) 04/01/2026 (Originally 9/1/2024)    DIABETIC EYE EXAM  04/01/2026 (Originally 12/18/2020)    LUNG CANCER SCREENING  04/22/2026 (Originally 10/15/2014)    INFLUENZA VACCINE  07/01/2025    DIABETIC FOOT EXAM  09/30/2025    LIPID PANEL  09/30/2025    HEMOGLOBIN A1C  10/22/2025    ANNUAL WELLNESS VISIT  04/22/2026    URINE MICROALBUMIN-CREATININE RATIO (uACR)  04/22/2026    COLORECTAL CANCER SCREENING  08/31/2026    HEPATITIS C SCREENING  Completed                                                                                                                                                CMS Preventative Services Quick Reference  Risk Factors Identified During Encounter  Fall Risk-High or Moderate: Discussed Fall Prevention in the home  Immunizations Discussed/Encouraged: Influenza, Shingrix, and COVID19  Dental Screening Recommended  Vision Screening Recommended    The above risks/problems have been discussed with the patient.  Pertinent information has been shared with the patient in the After Visit Summary.  An After Visit Summary and PPPS were made available to the patient.    Follow Up:   Next Medicare Wellness visit to be scheduled in 1 year.    "      Additional E&M Note during same encounter follows:  Patient has additional, significant, and separately identifiable condition(s)/problem(s) that require work above and beyond the Medicare Wellness Visit     Chief Complaint  Medicare Wellness-subsequent    Subjective   HPI  Fang is also being seen today for an annual adult preventative physical exam.  and Fang is also being seen today for follow-up on her chronic medical conditions:    Hyperlipidemia.  The patient is compliant with taking atorvastatin 40 mg daily.   She denies side effects.  She has been doing better since she was last seen.    Type 2 diabetes.  Patient is currently taking metformin 500 mg twice daily.  She denies any persistent side effects.                Objective   Vital Signs:  /71   Pulse 57   Ht 152.4 cm (60\")   Wt 59.3 kg (130 lb 12.8 oz)   SpO2 100%   BMI 25.55 kg/m²   Physical Exam  Vitals and nursing note reviewed.   Constitutional:       Appearance: Normal appearance. She is well-developed.   HENT:      Head: Normocephalic and atraumatic.   Eyes:      General: No scleral icterus.     Conjunctiva/sclera: Conjunctivae normal.   Cardiovascular:      Rate and Rhythm: Normal rate and regular rhythm.      Heart sounds: Normal heart sounds.   Pulmonary:      Effort: Pulmonary effort is normal.      Breath sounds: Normal breath sounds.   Musculoskeletal:         General: Normal range of motion.      Cervical back: Normal range of motion and neck supple.      Right lower leg: No edema.      Left lower leg: No edema.   Skin:     General: Skin is warm and dry.      Capillary Refill: Capillary refill takes less than 2 seconds.      Findings: No rash.   Neurological:      Mental Status: She is alert and oriented to person, place, and time.   Psychiatric:         Mood and Affect: Mood normal.         Behavior: Behavior normal.         Thought Content: Thought content normal.         Judgment: Judgment normal.             Common labs "          9/30/2024    15:03 9/30/2024    15:30 4/22/2025    10:35   Common Labs   Glucose  100     BUN  6     Creatinine  0.53     Sodium  140     Potassium  4.0     Chloride  104     Calcium  9.4     Albumin  4.2     Total Bilirubin  <0.2     Alkaline Phosphatase  70     AST (SGOT)  20     ALT (SGPT)  18     WBC  9.7     Hemoglobin  14.4     Hematocrit  41.9     Platelets  216     Total Cholesterol  102     Triglycerides  142     HDL Cholesterol  37     LDL Cholesterol   40     Hemoglobin A1C 6.8   7.7      TSH          9/30/2024    15:30   TSH   TSH 1.080           Assessment and Plan Additional age appropriate preventative wellness advice topics were discussed during today's preventative wellness exam(some topics already addressed during AWV portion of the note above):    Physical Activity: Advised cardiovascular activity 150 minutes per week as tolerated. (example brisk walk for 30 minutes, 5 days a week).     Nutrition: Discussed nutrition plan with patient. Information shared in after visit summary. Goal is for a well balanced diet to enhance overall health.     Healthy Weight: Discussed current and goal BMI with patient. Steps to attain this goal discussed. Information shared in after visit summary.     Medicare annual wellness visit, subsequent         Type 2 diabetes mellitus without complication, without long-term current use of insulin      Orders:    POC Glycosylated Hemoglobin (Hb A1C)    POC Microalbumin    Comprehensive Metabolic Panel    Mixed hyperlipidemia       Orders:    atorvastatin (LIPITOR) 40 MG tablet; Take 1 tablet by mouth every night at bedtime.    Encounter for long-term (current) use of medications    Orders:    Comprehensive Metabolic Panel    Gastroesophageal reflux disease without esophagitis    Orders:    pantoprazole (PROTONIX) 20 MG EC tablet; Take 1 tablet by mouth Daily.    Screening mammogram for breast cancer    Orders:    Mammo Screening Digital Tomosynthesis Bilateral With  CAD; Future    RHM.  Mammogram order entered.  Patient refused lung cancer screening.  Discussed next year undergoing colonoscopy for colon cancer screening.  Vaccine recommendations discussed.    Patient is also here to follow-up on uncontrolled type 2 diabetes.  Patient will increase metformin to 2 pills twice daily.  Patient will also improve diet and exercise.    Patient is also here to follow-up on chronic stable hyperlipidemia.   Surveillance labs were obtained today and any medication changes will be made based on lab results and will be called to the patient later this week.        Follow Up   Return in about 6 months (around 10/22/2025) for Diabetes.  Patient was given instructions and counseling regarding her condition or for health maintenance advice. Please see specific information pulled into the AVS if appropriate.

## 2025-04-22 NOTE — TELEPHONE ENCOUNTER
Caller: Fang Kunz    Relationship to patient: Self    Best call back number: [phone]    Patient is needing: PATIENT STATES THAT HER METFORMIN WAS INCREASED TO 2 TABLETS 2 TIMES A DAY, BUT HER PRESCRIPTION WAS SENT TO PHARMACY AS 1 TABLET 2 TIMES A DAY. PLEASE ADVISE.

## 2025-04-22 NOTE — ASSESSMENT & PLAN NOTE
Orders:    POC Glycosylated Hemoglobin (Hb A1C)    POC Microalbumin    Comprehensive Metabolic Panel

## 2025-04-23 LAB
ALBUMIN SERPL-MCNC: 4.2 G/DL (ref 3.8–4.9)
ALP SERPL-CCNC: 79 IU/L (ref 44–121)
ALT SERPL-CCNC: 19 IU/L (ref 0–32)
AST SERPL-CCNC: 19 IU/L (ref 0–40)
BILIRUB SERPL-MCNC: <0.2 MG/DL (ref 0–1.2)
BUN SERPL-MCNC: 7 MG/DL (ref 8–27)
BUN/CREAT SERPL: 12 (ref 12–28)
CALCIUM SERPL-MCNC: 9.8 MG/DL (ref 8.7–10.3)
CHLORIDE SERPL-SCNC: 101 MMOL/L (ref 96–106)
CO2 SERPL-SCNC: 22 MMOL/L (ref 20–29)
CREAT SERPL-MCNC: 0.58 MG/DL (ref 0.57–1)
EGFRCR SERPLBLD CKD-EPI 2021: 104 ML/MIN/1.73
GLOBULIN SER CALC-MCNC: 2.8 G/DL (ref 1.5–4.5)
GLUCOSE SERPL-MCNC: 135 MG/DL (ref 70–99)
POTASSIUM SERPL-SCNC: 4.6 MMOL/L (ref 3.5–5.2)
PROT SERPL-MCNC: 7 G/DL (ref 6–8.5)
SODIUM SERPL-SCNC: 141 MMOL/L (ref 134–144)

## 2025-04-25 ENCOUNTER — EXTERNAL PBMM DATA (OUTPATIENT)
Dept: PHARMACY | Facility: OTHER | Age: 60
End: 2025-04-25
Payer: MEDICARE

## 2025-05-06 ENCOUNTER — EXTERNAL PBMM DATA (OUTPATIENT)
Dept: PHARMACY | Facility: OTHER | Age: 60
End: 2025-05-06
Payer: MEDICARE

## 2025-05-16 ENCOUNTER — EXTERNAL PBMM DATA (OUTPATIENT)
Dept: PHARMACY | Facility: OTHER | Age: 60
End: 2025-05-16
Payer: MEDICARE

## 2025-06-17 ENCOUNTER — EXTERNAL PBMM DATA (OUTPATIENT)
Dept: PHARMACY | Facility: OTHER | Age: 60
End: 2025-06-17
Payer: MEDICARE

## 2025-07-11 ENCOUNTER — EXTERNAL PBMM DATA (OUTPATIENT)
Dept: PHARMACY | Facility: OTHER | Age: 60
End: 2025-07-11
Payer: MEDICARE

## 2025-07-24 ENCOUNTER — EXTERNAL PBMM DATA (OUTPATIENT)
Dept: PHARMACY | Facility: OTHER | Age: 60
End: 2025-07-24
Payer: MEDICARE

## 2025-07-26 DIAGNOSIS — E11.9 TYPE 2 DIABETES MELLITUS WITHOUT COMPLICATION, WITHOUT LONG-TERM CURRENT USE OF INSULIN: ICD-10-CM

## 2025-07-28 RX ORDER — METFORMIN HYDROCHLORIDE 500 MG/1
1000 TABLET, EXTENDED RELEASE ORAL 2 TIMES DAILY WITH MEALS
Qty: 360 TABLET | Refills: 0 | Status: SHIPPED | OUTPATIENT
Start: 2025-07-28

## 2025-07-28 NOTE — TELEPHONE ENCOUNTER
Rx Refill Note  Requested Prescriptions     Pending Prescriptions Disp Refills    metFORMIN ER (GLUCOPHAGE-XR) 500 MG 24 hr tablet [Pharmacy Med Name: metformin  mg tablet,extended release 24 hr] 360 tablet 0     Sig: TAKE TWO TABLETS BY MOUTH TWICE DAILY WITH MEALS      Last office visit with prescribing clinician: 4/22/2025   Last telemedicine visit with prescribing clinician: Visit date not found   Next office visit with prescribing clinician: 10/22/2025                         Would you like a call back once the refill request has been completed: [] Yes [] No    If the office needs to give you a call back, can they leave a voicemail: [] Yes [] No    Darby Robertson MA  07/28/25, 07:52 EDT